# Patient Record
Sex: MALE | Race: WHITE | NOT HISPANIC OR LATINO | Employment: OTHER | URBAN - METROPOLITAN AREA
[De-identification: names, ages, dates, MRNs, and addresses within clinical notes are randomized per-mention and may not be internally consistent; named-entity substitution may affect disease eponyms.]

---

## 2024-12-06 ENCOUNTER — HOSPITAL ENCOUNTER (INPATIENT)
Facility: HOSPITAL | Age: 69
LOS: 2 days | Discharge: HOME | End: 2024-12-08
Attending: EMERGENCY MEDICINE | Admitting: INTERNAL MEDICINE
Payer: MEDICARE

## 2024-12-06 ENCOUNTER — APPOINTMENT (OUTPATIENT)
Dept: CARDIOLOGY | Facility: HOSPITAL | Age: 69
DRG: 322 | End: 2024-12-06
Payer: MEDICARE

## 2024-12-06 ENCOUNTER — APPOINTMENT (OUTPATIENT)
Dept: RADIOLOGY | Facility: HOSPITAL | Age: 69
DRG: 322 | End: 2024-12-06
Payer: MEDICARE

## 2024-12-06 DIAGNOSIS — I21.4 NSTEMI (NON-ST ELEVATED MYOCARDIAL INFARCTION) (MULTI): Primary | ICD-10-CM

## 2024-12-06 PROBLEM — E11.9 TYPE 2 DIABETES MELLITUS, WITHOUT LONG-TERM CURRENT USE OF INSULIN (MULTI): Status: ACTIVE | Noted: 2024-12-06

## 2024-12-06 LAB
ALBUMIN SERPL BCP-MCNC: 3.5 G/DL (ref 3.4–5)
ALP SERPL-CCNC: 67 U/L (ref 33–136)
ALT SERPL W P-5'-P-CCNC: 11 U/L (ref 10–52)
ANION GAP SERPL CALCULATED.3IONS-SCNC: 12 MMOL/L (ref 10–20)
APTT PPP: 26.9 SECONDS (ref 22–32.5)
AST SERPL W P-5'-P-CCNC: 12 U/L (ref 9–39)
BASOPHILS # BLD AUTO: 0.03 X10*3/UL (ref 0–0.1)
BASOPHILS NFR BLD AUTO: 0.5 %
BILIRUB SERPL-MCNC: 0.2 MG/DL (ref 0–1.2)
BNP SERPL-MCNC: 27 PG/ML (ref 0–99)
BUN SERPL-MCNC: 16 MG/DL (ref 6–23)
CALCIUM SERPL-MCNC: 7.1 MG/DL (ref 8.6–10.3)
CARDIAC TROPONIN I PNL SERPL HS: 29 NG/L (ref 0–20)
CARDIAC TROPONIN I PNL SERPL HS: 81 NG/L (ref 0–20)
CHLORIDE SERPL-SCNC: 110 MMOL/L (ref 98–107)
CO2 SERPL-SCNC: 21 MMOL/L (ref 21–32)
CREAT SERPL-MCNC: 1.04 MG/DL (ref 0.5–1.3)
D DIMER PPP FEU-MCNC: 3.73 MG/L FEU (ref 0.19–0.5)
EGFRCR SERPLBLD CKD-EPI 2021: 77 ML/MIN/1.73M*2
EOSINOPHIL # BLD AUTO: 0.09 X10*3/UL (ref 0–0.7)
EOSINOPHIL NFR BLD AUTO: 1.5 %
ERYTHROCYTE [DISTWIDTH] IN BLOOD BY AUTOMATED COUNT: 15.1 % (ref 11.5–14.5)
GLUCOSE SERPL-MCNC: 105 MG/DL (ref 74–99)
HCT VFR BLD AUTO: 37.8 % (ref 41–52)
HGB BLD-MCNC: 12.3 G/DL (ref 13.5–17.5)
IMM GRANULOCYTES # BLD AUTO: 0.04 X10*3/UL (ref 0–0.7)
IMM GRANULOCYTES NFR BLD AUTO: 0.7 % (ref 0–0.9)
LYMPHOCYTES # BLD AUTO: 1.31 X10*3/UL (ref 1.2–4.8)
LYMPHOCYTES NFR BLD AUTO: 22.4 %
MAGNESIUM SERPL-MCNC: 1.82 MG/DL (ref 1.6–2.4)
MCH RBC QN AUTO: 28.1 PG (ref 26–34)
MCHC RBC AUTO-ENTMCNC: 32.5 G/DL (ref 32–36)
MCV RBC AUTO: 87 FL (ref 80–100)
MONOCYTES # BLD AUTO: 0.55 X10*3/UL (ref 0.1–1)
MONOCYTES NFR BLD AUTO: 9.4 %
NEUTROPHILS # BLD AUTO: 3.82 X10*3/UL (ref 1.2–7.7)
NEUTROPHILS NFR BLD AUTO: 65.5 %
NRBC BLD-RTO: 0 /100 WBCS (ref 0–0)
PLATELET # BLD AUTO: 255 X10*3/UL (ref 150–450)
POTASSIUM SERPL-SCNC: 3.4 MMOL/L (ref 3.5–5.3)
PROT SERPL-MCNC: 5.9 G/DL (ref 6.4–8.2)
RBC # BLD AUTO: 4.37 X10*6/UL (ref 4.5–5.9)
SODIUM SERPL-SCNC: 140 MMOL/L (ref 136–145)
WBC # BLD AUTO: 5.8 X10*3/UL (ref 4.4–11.3)

## 2024-12-06 PROCEDURE — 93005 ELECTROCARDIOGRAM TRACING: CPT

## 2024-12-06 PROCEDURE — 2500000004 HC RX 250 GENERAL PHARMACY W/ HCPCS (ALT 636 FOR OP/ED)

## 2024-12-06 PROCEDURE — 83735 ASSAY OF MAGNESIUM: CPT

## 2024-12-06 PROCEDURE — 2550000001 HC RX 255 CONTRASTS: Performed by: EMERGENCY MEDICINE

## 2024-12-06 PROCEDURE — 36415 COLL VENOUS BLD VENIPUNCTURE: CPT

## 2024-12-06 PROCEDURE — 96374 THER/PROPH/DIAG INJ IV PUSH: CPT

## 2024-12-06 PROCEDURE — 83880 ASSAY OF NATRIURETIC PEPTIDE: CPT

## 2024-12-06 PROCEDURE — 2060000001 HC INTERMEDIATE ICU ROOM DAILY

## 2024-12-06 PROCEDURE — 96375 TX/PRO/DX INJ NEW DRUG ADDON: CPT

## 2024-12-06 PROCEDURE — 99291 CRITICAL CARE FIRST HOUR: CPT | Mod: 25

## 2024-12-06 PROCEDURE — 71045 X-RAY EXAM CHEST 1 VIEW: CPT | Performed by: RADIOLOGY

## 2024-12-06 PROCEDURE — 2500000004 HC RX 250 GENERAL PHARMACY W/ HCPCS (ALT 636 FOR OP/ED): Performed by: EMERGENCY MEDICINE

## 2024-12-06 PROCEDURE — 84484 ASSAY OF TROPONIN QUANT: CPT

## 2024-12-06 PROCEDURE — 71045 X-RAY EXAM CHEST 1 VIEW: CPT

## 2024-12-06 PROCEDURE — 85730 THROMBOPLASTIN TIME PARTIAL: CPT | Performed by: EMERGENCY MEDICINE

## 2024-12-06 PROCEDURE — 85300 ANTITHROMBIN III ACTIVITY: CPT

## 2024-12-06 PROCEDURE — 93010 ELECTROCARDIOGRAM REPORT: CPT | Performed by: INTERNAL MEDICINE

## 2024-12-06 PROCEDURE — 99285 EMERGENCY DEPT VISIT HI MDM: CPT | Mod: 25 | Performed by: EMERGENCY MEDICINE

## 2024-12-06 PROCEDURE — 85025 COMPLETE CBC W/AUTO DIFF WBC: CPT

## 2024-12-06 PROCEDURE — 80053 COMPREHEN METABOLIC PANEL: CPT

## 2024-12-06 PROCEDURE — 71275 CT ANGIOGRAPHY CHEST: CPT | Performed by: RADIOLOGY

## 2024-12-06 PROCEDURE — 71275 CT ANGIOGRAPHY CHEST: CPT

## 2024-12-06 PROCEDURE — 99223 1ST HOSP IP/OBS HIGH 75: CPT | Performed by: INTERNAL MEDICINE

## 2024-12-06 RX ORDER — HEPARIN SODIUM 10000 [USP'U]/100ML
0-4000 INJECTION, SOLUTION INTRAVENOUS CONTINUOUS
Status: DISCONTINUED | OUTPATIENT
Start: 2024-12-06 | End: 2024-12-07

## 2024-12-06 RX ORDER — HEPARIN SODIUM 5000 [USP'U]/ML
2000-4000 INJECTION, SOLUTION INTRAVENOUS; SUBCUTANEOUS AS NEEDED
Status: DISCONTINUED | OUTPATIENT
Start: 2024-12-06 | End: 2024-12-08 | Stop reason: HOSPADM

## 2024-12-06 RX ORDER — ONDANSETRON HYDROCHLORIDE 2 MG/ML
4 INJECTION, SOLUTION INTRAVENOUS ONCE
Status: COMPLETED | OUTPATIENT
Start: 2024-12-06 | End: 2024-12-06

## 2024-12-06 RX ORDER — POLYETHYLENE GLYCOL 3350 17 G/17G
17 POWDER, FOR SOLUTION ORAL DAILY
Status: DISCONTINUED | OUTPATIENT
Start: 2024-12-07 | End: 2024-12-08 | Stop reason: HOSPADM

## 2024-12-06 RX ORDER — SODIUM CHLORIDE AND POTASSIUM CHLORIDE 150; 450 MG/100ML; MG/100ML
75 INJECTION, SOLUTION INTRAVENOUS CONTINUOUS
Status: DISCONTINUED | OUTPATIENT
Start: 2024-12-06 | End: 2024-12-07

## 2024-12-06 RX ORDER — HEPARIN SODIUM 5000 [USP'U]/ML
4000 INJECTION, SOLUTION INTRAVENOUS; SUBCUTANEOUS ONCE
Status: COMPLETED | OUTPATIENT
Start: 2024-12-06 | End: 2024-12-06

## 2024-12-06 RX ORDER — ASPIRIN 325 MG
325 TABLET ORAL ONCE
Status: DISCONTINUED | OUTPATIENT
Start: 2024-12-06 | End: 2024-12-07

## 2024-12-06 RX ORDER — MORPHINE SULFATE 4 MG/ML
4 INJECTION, SOLUTION INTRAMUSCULAR; INTRAVENOUS ONCE
Status: COMPLETED | OUTPATIENT
Start: 2024-12-06 | End: 2024-12-06

## 2024-12-06 RX ORDER — ACETAMINOPHEN 325 MG/1
650 TABLET ORAL EVERY 4 HOURS PRN
Status: DISCONTINUED | OUTPATIENT
Start: 2024-12-06 | End: 2024-12-08 | Stop reason: HOSPADM

## 2024-12-06 RX ORDER — ACETAMINOPHEN 160 MG/5ML
650 SOLUTION ORAL EVERY 4 HOURS PRN
Status: DISCONTINUED | OUTPATIENT
Start: 2024-12-06 | End: 2024-12-08 | Stop reason: HOSPADM

## 2024-12-06 RX ORDER — FAMOTIDINE 10 MG/ML
20 INJECTION INTRAVENOUS ONCE
Status: COMPLETED | OUTPATIENT
Start: 2024-12-06 | End: 2024-12-06

## 2024-12-06 RX ORDER — INSULIN LISPRO 100 [IU]/ML
0-10 INJECTION, SOLUTION INTRAVENOUS; SUBCUTANEOUS
Status: DISCONTINUED | OUTPATIENT
Start: 2024-12-07 | End: 2024-12-07

## 2024-12-06 RX ORDER — ATORVASTATIN CALCIUM 80 MG/1
80 TABLET, FILM COATED ORAL NIGHTLY
Status: DISCONTINUED | OUTPATIENT
Start: 2024-12-06 | End: 2024-12-08

## 2024-12-06 RX ORDER — ACETAMINOPHEN 650 MG/1
650 SUPPOSITORY RECTAL EVERY 4 HOURS PRN
Status: DISCONTINUED | OUTPATIENT
Start: 2024-12-06 | End: 2024-12-08 | Stop reason: HOSPADM

## 2024-12-06 RX ORDER — NITROGLYCERIN 0.4 MG/1
0.4 TABLET SUBLINGUAL EVERY 5 MIN PRN
Status: DISCONTINUED | OUTPATIENT
Start: 2024-12-06 | End: 2024-12-08 | Stop reason: HOSPADM

## 2024-12-06 RX ORDER — NAPROXEN SODIUM 220 MG/1
81 TABLET, FILM COATED ORAL DAILY
Status: DISCONTINUED | OUTPATIENT
Start: 2024-12-07 | End: 2024-12-07

## 2024-12-06 ASSESSMENT — COLUMBIA-SUICIDE SEVERITY RATING SCALE - C-SSRS
6. HAVE YOU EVER DONE ANYTHING, STARTED TO DO ANYTHING, OR PREPARED TO DO ANYTHING TO END YOUR LIFE?: NO
1. IN THE PAST MONTH, HAVE YOU WISHED YOU WERE DEAD OR WISHED YOU COULD GO TO SLEEP AND NOT WAKE UP?: NO
2. HAVE YOU ACTUALLY HAD ANY THOUGHTS OF KILLING YOURSELF?: NO

## 2024-12-06 ASSESSMENT — LIFESTYLE VARIABLES
EVER FELT BAD OR GUILTY ABOUT YOUR DRINKING: NO
HAVE YOU EVER FELT YOU SHOULD CUT DOWN ON YOUR DRINKING: NO
EVER HAD A DRINK FIRST THING IN THE MORNING TO STEADY YOUR NERVES TO GET RID OF A HANGOVER: NO
TOTAL SCORE: 0
HAVE PEOPLE ANNOYED YOU BY CRITICIZING YOUR DRINKING: NO

## 2024-12-06 ASSESSMENT — PAIN - FUNCTIONAL ASSESSMENT: PAIN_FUNCTIONAL_ASSESSMENT: 0-10

## 2024-12-06 ASSESSMENT — PAIN SCALES - GENERAL: PAINLEVEL_OUTOF10: 4

## 2024-12-06 NOTE — ED TRIAGE NOTES
CLINICAL NUTRITION SERVICES    Reviewed nutrition risk factors due to LOS. Pt is tolerating diet, eating well per nursing documentation. No nutrition issues identified at this time. RD will follow via rounds at this time, unless consulted.      Elizabeth Wu RDN, LD  Clinical Dietitian  [facility]: [phone]  [facility]: [phone]     Pt states he was finished eating dinner and got 10/10 chest pain, tightness in the chest and the pain radiated down left arm. Pt got two nitroglycerin in the squad, aspirin, and zofran. Pt states pain has gotten better but he is still having the pain.

## 2024-12-06 NOTE — Clinical Note
Angioplasty of the left anterior descending lesion. Inflation 1: Pressure = 12 kenroy; Duration = 20 sec. Inflation 2: Pressure = 12 kenroy; Duration = 20 sec.

## 2024-12-06 NOTE — PROGRESS NOTES
Attestation/Supervisory note for ALAN Garcia      The patient is a 69-year-old male presenting to the emergency department for evaluation of substernal chest pain and left arm pain.  He states his symptoms started about 2 to 3 hours prior to arrival.  The chest pain and the left arm pain started around the same time.  He states it is a heaviness.  No better or worse.  No radiation.  It is constant.  He denies any fever or chills.  No cough or congestion.  No headache or visual changes.  No palpitations.  No diaphoresis.  No abdominal pain.  No nausea vomiting.  No diarrhea or constipation.  No urinary complaints.  He denies any use of tobacco.  No history of CAD or ACS.  No history of PE or DVT.  No recent travel or immobility.  No recent surgery.  He does have a history of diabetes and hypertension.  No history of hyperlipidemia that he knows of.  All pertinent positives and negatives are recorded above.  All other systems reviewed and otherwise negative.  Vital signs within normal limits.  Physical exam with a well-nourished well-developed male in no acute distress.  HEENT exam within normal limits.  He has no evidence of airway compromise or respiratory distress.  Abdominal exam is benign.  He does not have any gross motor, neurologic or vascular deficits on exam.  Pulses are equal bilaterally.      EKG with sinus rhythm at 72 bpm, occasional PVCs, leftward axis, right bundle branch block pattern, normal ST segment, normal T waves      Oral aspirin, IV Pepcid, IV morphine and IV Zofran ordered.      Diagnostic labs with an elevated D-dimer, mild electrolyte imbalance, borderline troponin, but otherwise unremarkable.      Initial troponin 29.  Repeat trop 81      Heart score 4      CT angio chest for pulmonary embolism   Final Result   1. No acute pulmonary embolism to the segmental arterial level.   2. Additional findings as described above.             MACRO:   None        Signed by: Elver Garrido 12/6/2024 8:40  PM   Dictation workstation:   [id number]      XR chest 1 view   Final Result   No acute cardiopulmonary process.        MACRO:   None        Signed by: Elver Lloydjose 12/6/2024 6:33 PM   Dictation workstation:   [id number]      Transthoracic Echo (TTE) Complete    (Results Pending)      The patient does not have any evidence of a STEMI on EKG but does have an elevation in his troponin level.  No events on telemetry.  He remains hemodynamically stable while in the emergency room.  He does not have any evidence of airway compromise or respiratory distress on exam.  Chest x-ray without acute process.  No evidence of pneumonia or pneumothorax.  No evidence of CHF.  No widening of the mediastinum.  He does have an elevated D-dimer and a CT angio chest was ordered.  Showed no evidence of PE or dissection.  No other acute findings.  The results of the diagnostic studies and labs were discussed with the cardiologist, Dr. Escobar, and he did recommend treatment with heparin.  It was ordered      Impression/diagnosis:  1.  Chest pain, substernal  2.  Electrolyte imbalance  3.  Non-STEMI      Critical care time of 16 minutes billed for management of non-STEMI with initiation of oral aspirin, initiation of IV heparin, consultation with the patient regarding his results, monitoring patient's telemetry and consultation with the cardiologist.  This time excludes time for billable procedures.      critical care time billed for by me is non concurrent with time billed for by ALAN Arizmendi      I personally saw the patient and made/approve the management plan and take responsibility for the patient management.      I independently interpreted the following study (S) EKG and diagnostic labs      I personally discussed the patient's management with the patient      I reviewed the results of the diagnostic labs and diagnostic imaging.  Formal radiology read was completed by the radiologist.      Ileana Dempsey MD

## 2024-12-06 NOTE — Clinical Note
Angioplasty of the left anterior descending lesion. Inflation 1: Pressure = 8 kenroy; Duration = 20 sec. Inflation 2: Pressure = 8 kenroy; Duration = 20 sec. Inflation 3: Pressure = 8 kenroy; Duration = 20 sec.

## 2024-12-07 ENCOUNTER — APPOINTMENT (OUTPATIENT)
Dept: CARDIOLOGY | Facility: HOSPITAL | Age: 69
DRG: 322 | End: 2024-12-07
Payer: MEDICARE

## 2024-12-07 ENCOUNTER — APPOINTMENT (OUTPATIENT)
Dept: CARDIOLOGY | Facility: HOSPITAL | Age: 69
End: 2024-12-07
Payer: MEDICARE

## 2024-12-07 LAB
AORTIC VALVE MEAN GRADIENT: 3 MMHG
AORTIC VALVE PEAK VELOCITY: 1.32 M/S
APTT PPP: 36.7 SECONDS (ref 22–32.5)
AV PEAK GRADIENT: 7 MMHG
AVA (PEAK VEL): 2.2 CM2
AVA (VTI): 2.59 CM2
CARDIAC TROPONIN I PNL SERPL HS: 2695 NG/L (ref 0–20)
CARDIAC TROPONIN I PNL SERPL HS: ABNORMAL NG/L (ref 0–20)
CHOLEST SERPL-MCNC: 197 MG/DL (ref 0–199)
CHOLEST/HDLC SERPL: 3.8 {RATIO}
EJECTION FRACTION APICAL 4 CHAMBER: 61.7
EJECTION FRACTION: 58 %
GLUCOSE BLD MANUAL STRIP-MCNC: 125 MG/DL (ref 74–99)
GLUCOSE BLD MANUAL STRIP-MCNC: 129 MG/DL (ref 74–99)
GLUCOSE BLD MANUAL STRIP-MCNC: 136 MG/DL (ref 74–99)
GLUCOSE BLD MANUAL STRIP-MCNC: 142 MG/DL (ref 74–99)
HDLC SERPL-MCNC: 52.1 MG/DL
LDLC SERPL CALC-MCNC: 107 MG/DL
LEFT ATRIUM VOLUME AREA LENGTH INDEX BSA: 44.4 ML/M2
LEFT VENTRICLE INTERNAL DIMENSION DIASTOLE: 5.08 CM (ref 3.5–6)
LEFT VENTRICULAR OUTFLOW TRACT DIAMETER: 2.1 CM
LV EJECTION FRACTION BIPLANE: 62 %
MITRAL VALVE E/A RATIO: 1.12
MITRAL VALVE E/E' RATIO: 2.52
NON HDL CHOLESTEROL: 145 MG/DL (ref 0–149)
RIGHT VENTRICLE FREE WALL PEAK S': 16.96 CM/S
TRICUSPID ANNULAR PLANE SYSTOLIC EXCURSION: 2.5 CM
TRIGL SERPL-MCNC: 191 MG/DL (ref 0–149)
VLDL: 38 MG/DL (ref 0–40)

## 2024-12-07 PROCEDURE — 2500000004 HC RX 250 GENERAL PHARMACY W/ HCPCS (ALT 636 FOR OP/ED): Mod: JZ | Performed by: INTERNAL MEDICINE

## 2024-12-07 PROCEDURE — 4A023N7 MEASUREMENT OF CARDIAC SAMPLING AND PRESSURE, LEFT HEART, PERCUTANEOUS APPROACH: ICD-10-PCS | Performed by: INTERNAL MEDICINE

## 2024-12-07 PROCEDURE — 99153 MOD SED SAME PHYS/QHP EA: CPT | Performed by: INTERNAL MEDICINE

## 2024-12-07 PROCEDURE — C1760 CLOSURE DEV, VASC: HCPCS | Performed by: INTERNAL MEDICINE

## 2024-12-07 PROCEDURE — 2500000005 HC RX 250 GENERAL PHARMACY W/O HCPCS: Performed by: INTERNAL MEDICINE

## 2024-12-07 PROCEDURE — 85347 COAGULATION TIME ACTIVATED: CPT

## 2024-12-07 PROCEDURE — 2780000003 HC OR 278 NO HCPCS: Performed by: INTERNAL MEDICINE

## 2024-12-07 PROCEDURE — 93005 ELECTROCARDIOGRAM TRACING: CPT

## 2024-12-07 PROCEDURE — C1874 STENT, COATED/COV W/DEL SYS: HCPCS | Performed by: INTERNAL MEDICINE

## 2024-12-07 PROCEDURE — C1769 GUIDE WIRE: HCPCS | Performed by: INTERNAL MEDICINE

## 2024-12-07 PROCEDURE — 84484 ASSAY OF TROPONIN QUANT: CPT | Performed by: INTERNAL MEDICINE

## 2024-12-07 PROCEDURE — 2500000002 HC RX 250 W HCPCS SELF ADMINISTERED DRUGS (ALT 637 FOR MEDICARE OP, ALT 636 FOR OP/ED): Performed by: STUDENT IN AN ORGANIZED HEALTH CARE EDUCATION/TRAINING PROGRAM

## 2024-12-07 PROCEDURE — 93010 ELECTROCARDIOGRAM REPORT: CPT | Performed by: INTERNAL MEDICINE

## 2024-12-07 PROCEDURE — 93306 TTE W/DOPPLER COMPLETE: CPT | Performed by: INTERNAL MEDICINE

## 2024-12-07 PROCEDURE — C1887 CATHETER, GUIDING: HCPCS | Performed by: INTERNAL MEDICINE

## 2024-12-07 PROCEDURE — 2500000002 HC RX 250 W HCPCS SELF ADMINISTERED DRUGS (ALT 637 FOR MEDICARE OP, ALT 636 FOR OP/ED): Performed by: INTERNAL MEDICINE

## 2024-12-07 PROCEDURE — 82947 ASSAY GLUCOSE BLOOD QUANT: CPT

## 2024-12-07 PROCEDURE — 2060000001 HC INTERMEDIATE ICU ROOM DAILY

## 2024-12-07 PROCEDURE — 92928 PRQ TCAT PLMT NTRAC ST 1 LES: CPT | Performed by: INTERNAL MEDICINE

## 2024-12-07 PROCEDURE — B2111ZZ FLUOROSCOPY OF MULTIPLE CORONARY ARTERIES USING LOW OSMOLAR CONTRAST: ICD-10-PCS | Performed by: INTERNAL MEDICINE

## 2024-12-07 PROCEDURE — 2500000004 HC RX 250 GENERAL PHARMACY W/ HCPCS (ALT 636 FOR OP/ED): Performed by: NURSE PRACTITIONER

## 2024-12-07 PROCEDURE — 2720000007 HC OR 272 NO HCPCS: Performed by: INTERNAL MEDICINE

## 2024-12-07 PROCEDURE — 93458 L HRT ARTERY/VENTRICLE ANGIO: CPT | Performed by: INTERNAL MEDICINE

## 2024-12-07 PROCEDURE — 83718 ASSAY OF LIPOPROTEIN: CPT | Performed by: INTERNAL MEDICINE

## 2024-12-07 PROCEDURE — 99232 SBSQ HOSP IP/OBS MODERATE 35: CPT | Performed by: STUDENT IN AN ORGANIZED HEALTH CARE EDUCATION/TRAINING PROGRAM

## 2024-12-07 PROCEDURE — C1894 INTRO/SHEATH, NON-LASER: HCPCS | Performed by: INTERNAL MEDICINE

## 2024-12-07 PROCEDURE — 93306 TTE W/DOPPLER COMPLETE: CPT

## 2024-12-07 PROCEDURE — 2500000001 HC RX 250 WO HCPCS SELF ADMINISTERED DRUGS (ALT 637 FOR MEDICARE OP): Performed by: INTERNAL MEDICINE

## 2024-12-07 PROCEDURE — 027034Z DILATION OF CORONARY ARTERY, ONE ARTERY WITH DRUG-ELUTING INTRALUMINAL DEVICE, PERCUTANEOUS APPROACH: ICD-10-PCS | Performed by: INTERNAL MEDICINE

## 2024-12-07 PROCEDURE — 99152 MOD SED SAME PHYS/QHP 5/>YRS: CPT | Performed by: INTERNAL MEDICINE

## 2024-12-07 PROCEDURE — 99222 1ST HOSP IP/OBS MODERATE 55: CPT | Performed by: NURSE PRACTITIONER

## 2024-12-07 PROCEDURE — 36415 COLL VENOUS BLD VENIPUNCTURE: CPT | Performed by: INTERNAL MEDICINE

## 2024-12-07 PROCEDURE — 2550000001 HC RX 255 CONTRASTS: Performed by: INTERNAL MEDICINE

## 2024-12-07 PROCEDURE — 2500000004 HC RX 250 GENERAL PHARMACY W/ HCPCS (ALT 636 FOR OP/ED)

## 2024-12-07 PROCEDURE — C1725 CATH, TRANSLUMIN NON-LASER: HCPCS | Performed by: INTERNAL MEDICINE

## 2024-12-07 PROCEDURE — 2500000004 HC RX 250 GENERAL PHARMACY W/ HCPCS (ALT 636 FOR OP/ED): Performed by: INTERNAL MEDICINE

## 2024-12-07 PROCEDURE — C9600 PERC DRUG-EL COR STENT SING: HCPCS | Mod: LD | Performed by: INTERNAL MEDICINE

## 2024-12-07 PROCEDURE — 85730 THROMBOPLASTIN TIME PARTIAL: CPT

## 2024-12-07 DEVICE — STENT ONYXNG30015UX ONYX 3.00X15RX
Type: IMPLANTABLE DEVICE | Site: HEART | Status: FUNCTIONAL
Brand: ONYX FRONTIER™

## 2024-12-07 RX ORDER — POTASSIUM CHLORIDE 20 MEQ/1
40 TABLET, EXTENDED RELEASE ORAL ONCE
Status: COMPLETED | OUTPATIENT
Start: 2024-12-07 | End: 2024-12-07

## 2024-12-07 RX ORDER — VERAPAMIL HYDROCHLORIDE 2.5 MG/ML
INJECTION, SOLUTION INTRAVENOUS AS NEEDED
Status: DISCONTINUED | OUTPATIENT
Start: 2024-12-07 | End: 2024-12-07 | Stop reason: HOSPADM

## 2024-12-07 RX ORDER — INSULIN LISPRO 100 [IU]/ML
0-5 INJECTION, SOLUTION INTRAVENOUS; SUBCUTANEOUS
Status: DISCONTINUED | OUTPATIENT
Start: 2024-12-07 | End: 2024-12-08 | Stop reason: HOSPADM

## 2024-12-07 RX ORDER — SODIUM CHLORIDE 9 MG/ML
100 INJECTION, SOLUTION INTRAVENOUS CONTINUOUS
Status: ACTIVE | OUTPATIENT
Start: 2024-12-07 | End: 2024-12-07

## 2024-12-07 RX ORDER — FENTANYL CITRATE 50 UG/ML
INJECTION, SOLUTION INTRAMUSCULAR; INTRAVENOUS AS NEEDED
Status: DISCONTINUED | OUTPATIENT
Start: 2024-12-07 | End: 2024-12-07 | Stop reason: HOSPADM

## 2024-12-07 RX ORDER — WATER
100 LIQUID (ML) MISCELLANEOUS
Status: DISCONTINUED | OUTPATIENT
Start: 2024-12-07 | End: 2024-12-08 | Stop reason: HOSPADM

## 2024-12-07 RX ORDER — LIDOCAINE HYDROCHLORIDE 10 MG/ML
INJECTION, SOLUTION EPIDURAL; INFILTRATION; INTRACAUDAL; PERINEURAL AS NEEDED
Status: DISCONTINUED | OUTPATIENT
Start: 2024-12-07 | End: 2024-12-07 | Stop reason: HOSPADM

## 2024-12-07 RX ORDER — NITROGLYCERIN 40 MG/100ML
INJECTION INTRAVENOUS AS NEEDED
Status: DISCONTINUED | OUTPATIENT
Start: 2024-12-07 | End: 2024-12-07 | Stop reason: HOSPADM

## 2024-12-07 RX ORDER — LISINOPRIL AND HYDROCHLOROTHIAZIDE 10; 12.5 MG/1; MG/1
1 TABLET ORAL DAILY
COMMUNITY
Start: 2023-11-13 | End: 2024-12-08 | Stop reason: HOSPADM

## 2024-12-07 RX ORDER — IODIXANOL 320 MG/ML
INJECTION, SOLUTION INTRAVASCULAR AS NEEDED
Status: DISCONTINUED | OUTPATIENT
Start: 2024-12-07 | End: 2024-12-07 | Stop reason: HOSPADM

## 2024-12-07 RX ORDER — ENOXAPARIN SODIUM 100 MG/ML
40 INJECTION SUBCUTANEOUS EVERY 24 HOURS
Status: DISCONTINUED | OUTPATIENT
Start: 2024-12-07 | End: 2024-12-08 | Stop reason: HOSPADM

## 2024-12-07 RX ORDER — HEPARIN SODIUM 1000 [USP'U]/ML
INJECTION, SOLUTION INTRAVENOUS; SUBCUTANEOUS AS NEEDED
Status: DISCONTINUED | OUTPATIENT
Start: 2024-12-07 | End: 2024-12-07 | Stop reason: HOSPADM

## 2024-12-07 RX ORDER — MIDAZOLAM HYDROCHLORIDE 1 MG/ML
INJECTION, SOLUTION INTRAMUSCULAR; INTRAVENOUS AS NEEDED
Status: DISCONTINUED | OUTPATIENT
Start: 2024-12-07 | End: 2024-12-07 | Stop reason: HOSPADM

## 2024-12-07 RX ORDER — NAPROXEN SODIUM 220 MG/1
81 TABLET, FILM COATED ORAL DAILY
Status: DISCONTINUED | OUTPATIENT
Start: 2024-12-08 | End: 2024-12-08 | Stop reason: HOSPADM

## 2024-12-07 SDOH — ECONOMIC STABILITY: FOOD INSECURITY: WITHIN THE PAST 12 MONTHS, THE FOOD YOU BOUGHT JUST DIDN'T LAST AND YOU DIDN'T HAVE MONEY TO GET MORE.: NEVER TRUE

## 2024-12-07 SDOH — SOCIAL STABILITY: SOCIAL INSECURITY: WITHIN THE LAST YEAR, HAVE YOU BEEN AFRAID OF YOUR PARTNER OR EX-PARTNER?: NO

## 2024-12-07 SDOH — HEALTH STABILITY: MENTAL HEALTH: HOW OFTEN DO YOU HAVE A DRINK CONTAINING ALCOHOL?: 2-4 TIMES A MONTH

## 2024-12-07 SDOH — SOCIAL STABILITY: SOCIAL INSECURITY: ABUSE: ADULT

## 2024-12-07 SDOH — ECONOMIC STABILITY: HOUSING INSECURITY: IN THE LAST 12 MONTHS, WAS THERE A TIME WHEN YOU WERE NOT ABLE TO PAY THE MORTGAGE OR RENT ON TIME?: NO

## 2024-12-07 SDOH — ECONOMIC STABILITY: FOOD INSECURITY: WITHIN THE PAST 12 MONTHS, YOU WORRIED THAT YOUR FOOD WOULD RUN OUT BEFORE YOU GOT THE MONEY TO BUY MORE.: NEVER TRUE

## 2024-12-07 SDOH — SOCIAL STABILITY: SOCIAL INSECURITY: WITHIN THE LAST YEAR, HAVE YOU BEEN HUMILIATED OR EMOTIONALLY ABUSED IN OTHER WAYS BY YOUR PARTNER OR EX-PARTNER?: NO

## 2024-12-07 SDOH — HEALTH STABILITY: PHYSICAL HEALTH
HOW OFTEN DO YOU NEED TO HAVE SOMEONE HELP YOU WHEN YOU READ INSTRUCTIONS, PAMPHLETS, OR OTHER WRITTEN MATERIAL FROM YOUR DOCTOR OR PHARMACY?: NEVER

## 2024-12-07 SDOH — SOCIAL STABILITY: SOCIAL NETWORK
DO YOU BELONG TO ANY CLUBS OR ORGANIZATIONS SUCH AS CHURCH GROUPS, UNIONS, FRATERNAL OR ATHLETIC GROUPS, OR SCHOOL GROUPS?: NO

## 2024-12-07 SDOH — HEALTH STABILITY: PHYSICAL HEALTH: ON AVERAGE, HOW MANY DAYS PER WEEK DO YOU ENGAGE IN MODERATE TO STRENUOUS EXERCISE (LIKE A BRISK WALK)?: 7 DAYS

## 2024-12-07 SDOH — HEALTH STABILITY: MENTAL HEALTH: HOW OFTEN DO YOU HAVE SIX OR MORE DRINKS ON ONE OCCASION?: NEVER

## 2024-12-07 SDOH — ECONOMIC STABILITY: INCOME INSECURITY: IN THE PAST 12 MONTHS HAS THE ELECTRIC, GAS, OIL, OR WATER COMPANY THREATENED TO SHUT OFF SERVICES IN YOUR HOME?: NO

## 2024-12-07 SDOH — SOCIAL STABILITY: SOCIAL INSECURITY: DO YOU FEEL ANYONE HAS EXPLOITED OR TAKEN ADVANTAGE OF YOU FINANCIALLY OR OF YOUR PERSONAL PROPERTY?: NO

## 2024-12-07 SDOH — HEALTH STABILITY: MENTAL HEALTH
DO YOU FEEL STRESS - TENSE, RESTLESS, NERVOUS, OR ANXIOUS, OR UNABLE TO SLEEP AT NIGHT BECAUSE YOUR MIND IS TROUBLED ALL THE TIME - THESE DAYS?: NOT AT ALL

## 2024-12-07 SDOH — SOCIAL STABILITY: SOCIAL INSECURITY
WITHIN THE LAST YEAR, HAVE YOU BEEN KICKED, HIT, SLAPPED, OR OTHERWISE PHYSICALLY HURT BY YOUR PARTNER OR EX-PARTNER?: NO

## 2024-12-07 SDOH — HEALTH STABILITY: PHYSICAL HEALTH: ON AVERAGE, HOW MANY MINUTES DO YOU ENGAGE IN EXERCISE AT THIS LEVEL?: 50 MIN

## 2024-12-07 SDOH — SOCIAL STABILITY: SOCIAL INSECURITY: ARE YOU MARRIED, WIDOWED, DIVORCED, SEPARATED, NEVER MARRIED, OR LIVING WITH A PARTNER?: MARRIED

## 2024-12-07 SDOH — SOCIAL STABILITY: SOCIAL INSECURITY: ARE THERE ANY APPARENT SIGNS OF INJURIES/BEHAVIORS THAT COULD BE RELATED TO ABUSE/NEGLECT?: NO

## 2024-12-07 SDOH — HEALTH STABILITY: MENTAL HEALTH: HOW MANY DRINKS CONTAINING ALCOHOL DO YOU HAVE ON A TYPICAL DAY WHEN YOU ARE DRINKING?: 1 OR 2

## 2024-12-07 SDOH — SOCIAL STABILITY: SOCIAL INSECURITY: HAVE YOU HAD THOUGHTS OF HARMING ANYONE ELSE?: NO

## 2024-12-07 SDOH — SOCIAL STABILITY: SOCIAL INSECURITY: DOES ANYONE TRY TO KEEP YOU FROM HAVING/CONTACTING OTHER FRIENDS OR DOING THINGS OUTSIDE YOUR HOME?: NO

## 2024-12-07 SDOH — SOCIAL STABILITY: SOCIAL INSECURITY
WITHIN THE LAST YEAR, HAVE YOU BEEN RAPED OR FORCED TO HAVE ANY KIND OF SEXUAL ACTIVITY BY YOUR PARTNER OR EX-PARTNER?: NO

## 2024-12-07 SDOH — ECONOMIC STABILITY: HOUSING INSECURITY: IN THE PAST 12 MONTHS, HOW MANY TIMES HAVE YOU MOVED WHERE YOU WERE LIVING?: 0

## 2024-12-07 SDOH — SOCIAL STABILITY: SOCIAL NETWORK
IN A TYPICAL WEEK, HOW MANY TIMES DO YOU TALK ON THE PHONE WITH FAMILY, FRIENDS, OR NEIGHBORS?: MORE THAN THREE TIMES A WEEK

## 2024-12-07 SDOH — SOCIAL STABILITY: SOCIAL INSECURITY: WERE YOU ABLE TO COMPLETE ALL THE BEHAVIORAL HEALTH SCREENINGS?: YES

## 2024-12-07 SDOH — ECONOMIC STABILITY: HOUSING INSECURITY: AT ANY TIME IN THE PAST 12 MONTHS, WERE YOU HOMELESS OR LIVING IN A SHELTER (INCLUDING NOW)?: NO

## 2024-12-07 SDOH — SOCIAL STABILITY: SOCIAL NETWORK: HOW OFTEN DO YOU ATTEND MEETINGS OF THE CLUBS OR ORGANIZATIONS YOU BELONG TO?: NEVER

## 2024-12-07 SDOH — ECONOMIC STABILITY: FOOD INSECURITY: HOW HARD IS IT FOR YOU TO PAY FOR THE VERY BASICS LIKE FOOD, HOUSING, MEDICAL CARE, AND HEATING?: NOT HARD AT ALL

## 2024-12-07 SDOH — SOCIAL STABILITY: SOCIAL INSECURITY: HAS ANYONE EVER THREATENED TO HURT YOUR FAMILY OR YOUR PETS?: NO

## 2024-12-07 SDOH — SOCIAL STABILITY: SOCIAL INSECURITY: HAVE YOU HAD ANY THOUGHTS OF HARMING ANYONE ELSE?: NO

## 2024-12-07 SDOH — SOCIAL STABILITY: SOCIAL INSECURITY: DO YOU FEEL UNSAFE GOING BACK TO THE PLACE WHERE YOU ARE LIVING?: NO

## 2024-12-07 SDOH — ECONOMIC STABILITY: TRANSPORTATION INSECURITY: IN THE PAST 12 MONTHS, HAS LACK OF TRANSPORTATION KEPT YOU FROM MEDICAL APPOINTMENTS OR FROM GETTING MEDICATIONS?: NO

## 2024-12-07 SDOH — SOCIAL STABILITY: SOCIAL INSECURITY: ARE YOU OR HAVE YOU BEEN THREATENED OR ABUSED PHYSICALLY, EMOTIONALLY, OR SEXUALLY BY ANYONE?: NO

## 2024-12-07 SDOH — SOCIAL STABILITY: SOCIAL NETWORK: HOW OFTEN DO YOU GET TOGETHER WITH FRIENDS OR RELATIVES?: ONCE A WEEK

## 2024-12-07 SDOH — SOCIAL STABILITY: SOCIAL NETWORK: HOW OFTEN DO YOU ATTEND CHURCH OR RELIGIOUS SERVICES?: NEVER

## 2024-12-07 ASSESSMENT — COGNITIVE AND FUNCTIONAL STATUS - GENERAL
MOBILITY SCORE: 24
DAILY ACTIVITIY SCORE: 24
MOBILITY SCORE: 24
PATIENT BASELINE BEDBOUND: NO
CLIMB 3 TO 5 STEPS WITH RAILING: A LITTLE
DAILY ACTIVITIY SCORE: 24
MOBILITY SCORE: 23
DAILY ACTIVITIY SCORE: 24

## 2024-12-07 ASSESSMENT — PAIN SCALES - GENERAL
PAINLEVEL_OUTOF10: 5 - MODERATE PAIN
PAINLEVEL_OUTOF10: 2
PAINLEVEL_OUTOF10: 0 - NO PAIN
PAINLEVEL_OUTOF10: 1
PAINLEVEL_OUTOF10: 1

## 2024-12-07 ASSESSMENT — PAIN - FUNCTIONAL ASSESSMENT
PAIN_FUNCTIONAL_ASSESSMENT: 0-10

## 2024-12-07 ASSESSMENT — LIFESTYLE VARIABLES
SKIP TO QUESTIONS 9-10: 1
AUDIT-C TOTAL SCORE: 2
SKIP TO QUESTIONS 9-10: 1
AUDIT-C TOTAL SCORE: 2
HOW OFTEN DO YOU HAVE A DRINK CONTAINING ALCOHOL: 2-4 TIMES A MONTH
HOW MANY STANDARD DRINKS CONTAINING ALCOHOL DO YOU HAVE ON A TYPICAL DAY: 1 OR 2
HOW OFTEN DO YOU HAVE 6 OR MORE DRINKS ON ONE OCCASION: NEVER
AUDIT-C TOTAL SCORE: 2
PRESCIPTION_ABUSE_PAST_12_MONTHS: NO
SUBSTANCE_ABUSE_PAST_12_MONTHS: NO

## 2024-12-07 ASSESSMENT — ACTIVITIES OF DAILY LIVING (ADL)
HEARING - RIGHT EAR: FUNCTIONAL
HEARING - LEFT EAR: FUNCTIONAL
WALKS IN HOME: INDEPENDENT
BATHING: INDEPENDENT
DRESSING YOURSELF: INDEPENDENT
LACK_OF_TRANSPORTATION: NO
TOILETING: INDEPENDENT
FEEDING YOURSELF: INDEPENDENT
PATIENT'S MEMORY ADEQUATE TO SAFELY COMPLETE DAILY ACTIVITIES?: YES
ADEQUATE_TO_COMPLETE_ADL: YES
GROOMING: INDEPENDENT
JUDGMENT_ADEQUATE_SAFELY_COMPLETE_DAILY_ACTIVITIES: YES

## 2024-12-07 ASSESSMENT — PAIN DESCRIPTION - DESCRIPTORS
DESCRIPTORS: ACHING
DESCRIPTORS: ACHING

## 2024-12-07 ASSESSMENT — PATIENT HEALTH QUESTIONNAIRE - PHQ9
2. FEELING DOWN, DEPRESSED OR HOPELESS: NOT AT ALL
SUM OF ALL RESPONSES TO PHQ9 QUESTIONS 1 & 2: 0
1. LITTLE INTEREST OR PLEASURE IN DOING THINGS: NOT AT ALL

## 2024-12-07 ASSESSMENT — PAIN DESCRIPTION - LOCATION: LOCATION: HEAD

## 2024-12-07 ASSESSMENT — PAIN DESCRIPTION - ORIENTATION: ORIENTATION: MID

## 2024-12-07 NOTE — CARE PLAN
The patient's goals for the shift include      The clinical goals for the shift include no chest pain    Over the shift, the patient did not make progress toward the following goals. Barriers to progression include chest pain on admission to ed. Recommendations to address these barriers include moniotr vitals and for CP.

## 2024-12-07 NOTE — NURSING NOTE
0512 Reports chest pain, 3/10, left arm pain 5/10 and jaw pain 2/10 with his back teeth hurting,  /80 (101), SR 62 noted on telemetry, 1 NGT 0.4 sublingual administered.    0517 /71 (83) HR 71, chest pain and jaw pain 0/10, left arm pain 1/10.

## 2024-12-07 NOTE — CONSULTS
Inpatient consult to Cardiology  Consult performed by: LORE Adams-CNP  Consult ordered by: Chauncey Reed DO  Reason for consult: Chest pain, acute NSTEMI        History Of Present Illness:    Albin Estrada is a 69 y.o. male presenting with chest pain.  No cardiologist.  Past medical history of diabetes mellitus type 2, obesity.  Family history of cardiac disease with history of bypass.  Reports he was eating dinner last evening and shortly after developed a tightness in his left arm and felt warm with heaviness across his chest as well as some shortness of breath and pain to his left jaw.  The patient did not wish to come to the emergency department was convinced by his wife.  In the emergency department he received nitroglycerin and after 2 dosages had some relief in his chest.  EKG in emergency department a sinus rhythm with right bundle branch block with a wide QRS which is chronic compared to previous from earlier this year.  High-sensitivity troponin values of 29, 81, 2695 and 11,214.  CT chest angio negative for pulmonary emboli.  Chest x-ray negative.  Creatinine 1.04 with a hemoglobin of 12.3.  Patient was placed on a heparin drip and admitted on telemetry further testing treatment.  Note: Chest pain did nearly resolve overnight, this morning at approximately 400 he had a recurrence of this chest discomfort while on a heparin drip which improved nitroglycerin again.     Last Recorded Vitals:  Vitals:    12/07/24 0130 12/07/24 0512 12/07/24 0517 12/07/24 0706   BP: 138/63 158/80 126/71 117/65   BP Location: Right arm Right arm  Right arm   Patient Position: Lying   Lying   Pulse: 58 68 74    Resp: 18 16  18   Temp: 36.2 °C (97.2 °F) 36.5 °C (97.7 °F)  36.5 °C (97.7 °F)   TempSrc: Temporal Temporal  Temporal   SpO2: 95% 98% 94% 94%   Weight:       Height:           Last Labs:  CBC - 12/6/2024:  6:18 PM  5.8 12.3 255    37.8      CMP - 12/6/2024:  6:18 PM  7.1 5.9 12 --- 0.2   _ 3.5 11  67      PTT - 12/7/2024:  4:54 AM  _   _ 36.7     Troponin I, High Sensitivity   Date/Time Value Ref Range Status   12/07/2024 04:54 AM 11,214 (HH) 0 - 20 ng/L Final     Comment:     Previous result verified on 12/6/2024 2017 on specimen/case [id number] called with component TRPHS for procedure Troponin, High Sensitivity, 1 Hour with value 81 ng/L.   12/07/2024 12:49 AM 2,695 (HH) 0 - 20 ng/L Final     Comment:     Previous result verified on 12/6/2024 2017 on specimen/case [id number] called with component TRPHS for procedure Troponin, High Sensitivity, 1 Hour with value 81 ng/L.   12/06/2024 07:39 PM 81 (HH) 0 - 20 ng/L Final     BNP   Date/Time Value Ref Range Status   12/06/2024 06:18 PM 27 0 - 99 pg/mL Final     Hemoglobin A1C   Date/Time Value Ref Range Status   02/23/2024 06:53 AM 6.3 (H) 4.3 - 5.6 % Final     Comment:     American Diabetes Association guidelines indicate that patients with HgbA1c in the range 5.7-6.4% are at increased risk for development of diabetes, and intervention by lifestyle modification may be beneficial. HgbA1c greater or equal to 6.5% is considered diagnostic of diabetes.   12/13/2022 08:50 AM 7.4 (H) 4.3 - 5.6 % Final     Comment:     American Diabetes Association guidelines indicate that patients with HgbA1c in the range 5.7-6.4% are at increased risk for development of diabetes, and intervention by lifestyle modification may be beneficial. HgbA1c greater or equal to 6.5% is considered diagnostic of diabetes.     LDL Calculated   Date/Time Value Ref Range Status   12/07/2024 04:54  (H) <=99 mg/dL Final     Comment:                                 Near   Borderline      AGE      Desirable  Optimal    High     High     Very High     0-19 Y     0 - 109     ---    110-129   >/= 130     ----    20-24 Y     0 - 119     ---    120-159   >/= 160     ----      >24 Y     0 -  99   100-129  130-159   160-189     >/=190       VLDL   Date/Time Value Ref Range Status   12/07/2024  04:54 AM 38 0 - 40 mg/dL Final      Results for orders placed or performed during the hospital encounter of 12/06/24 (from the past 24 hours)   CBC and Auto Differential   Result Value Ref Range    WBC 5.8 4.4 - 11.3 x10*3/uL    nRBC 0.0 0.0 - 0.0 /100 WBCs    RBC 4.37 (L) 4.50 - 5.90 x10*6/uL    Hemoglobin 12.3 (L) 13.5 - 17.5 g/dL    Hematocrit 37.8 (L) 41.0 - 52.0 %    MCV 87 80 - 100 fL    MCH 28.1 26.0 - 34.0 pg    MCHC 32.5 32.0 - 36.0 g/dL    RDW 15.1 (H) 11.5 - 14.5 %    Platelets 255 150 - 450 x10*3/uL    Neutrophils % 65.5 40.0 - 80.0 %    Immature Granulocytes %, Automated 0.7 0.0 - 0.9 %    Lymphocytes % 22.4 13.0 - 44.0 %    Monocytes % 9.4 2.0 - 10.0 %    Eosinophils % 1.5 0.0 - 6.0 %    Basophils % 0.5 0.0 - 2.0 %    Neutrophils Absolute 3.82 1.20 - 7.70 x10*3/uL    Immature Granulocytes Absolute, Automated 0.04 0.00 - 0.70 x10*3/uL    Lymphocytes Absolute 1.31 1.20 - 4.80 x10*3/uL    Monocytes Absolute 0.55 0.10 - 1.00 x10*3/uL    Eosinophils Absolute 0.09 0.00 - 0.70 x10*3/uL    Basophils Absolute 0.03 0.00 - 0.10 x10*3/uL   Comprehensive metabolic panel   Result Value Ref Range    Glucose 105 (H) 74 - 99 mg/dL    Sodium 140 136 - 145 mmol/L    Potassium 3.4 (L) 3.5 - 5.3 mmol/L    Chloride 110 (H) 98 - 107 mmol/L    Bicarbonate 21 21 - 32 mmol/L    Anion Gap 12 10 - 20 mmol/L    Urea Nitrogen 16 6 - 23 mg/dL    Creatinine 1.04 0.50 - 1.30 mg/dL    eGFR 77 >60 mL/min/1.73m*2    Calcium 7.1 (L) 8.6 - 10.3 mg/dL    Albumin 3.5 3.4 - 5.0 g/dL    Alkaline Phosphatase 67 33 - 136 U/L    Total Protein 5.9 (L) 6.4 - 8.2 g/dL    AST 12 9 - 39 U/L    Bilirubin, Total 0.2 0.0 - 1.2 mg/dL    ALT 11 10 - 52 U/L   B-Type Natriuretic Peptide   Result Value Ref Range    BNP 27 0 - 99 pg/mL   Magnesium   Result Value Ref Range    Magnesium 1.82 1.60 - 2.40 mg/dL   D-dimer, quantitative   Result Value Ref Range    D-Dimer Non VTE, Quant (mg/L FEU) 3.73 (H) 0.19 - 0.50 mg/L FEU   Troponin I, High Sensitivity, Initial    Result Value Ref Range    Troponin I, High Sensitivity 29 (H) 0 - 20 ng/L   aPTT   Result Value Ref Range    aPTT 26.9 22.0 - 32.5 seconds   Troponin, High Sensitivity, 1 Hour   Result Value Ref Range    Troponin I, High Sensitivity 81 (HH) 0 - 20 ng/L   Troponin I, High Sensitivity, Initial   Result Value Ref Range    Troponin I, High Sensitivity 2,695 (HH) 0 - 20 ng/L   POCT GLUCOSE   Result Value Ref Range    POCT Glucose 142 (H) 74 - 99 mg/dL   Lipid Panel   Result Value Ref Range    Cholesterol 197 0 - 199 mg/dL    HDL-Cholesterol 52.1 mg/dL    Cholesterol/HDL Ratio 3.8     LDL Calculated 107 (H) <=99 mg/dL    VLDL 38 0 - 40 mg/dL    Triglycerides 191 (H) 0 - 149 mg/dL    Non HDL Cholesterol 145 0 - 149 mg/dL   Troponin, High Sensitivity, 1 Hour   Result Value Ref Range    Troponin I, High Sensitivity 11,214 (HH) 0 - 20 ng/L   aPTT   Result Value Ref Range    aPTT 36.7 (H) 22.0 - 32.5 seconds       Last I/O:  I/O last 3 completed shifts:  In: 434.9 (4.1 mL/kg) [I.V.:434.9 (4.1 mL/kg)]  Out: - (0 mL/kg)   Weight: 106.6 kg     Past Cardiology Tests (Last 3 Years):  EKG: Sinus rhythm with right bundle branch block and wide QRS    Past Medical History:  He has a past medical history of Diabetes mellitus (Multi).  Obesity.  Remote tobacco use, hyperlipidemia    Past Surgical History:  He has a past surgical history that includes Abdominal surgery and Colon surgery.      Social History:  He reports that he has never smoked. He has never been exposed to tobacco smoke. He has never used smokeless tobacco. He reports current alcohol use of about 1.0 standard drink of alcohol per week. He reports that he does not use drugs.    Family History:  Significant family history for coronary artery disease with history of bypass     Allergies:  Metformin    Inpatient Medications:  Scheduled medications   Medication Dose Route Frequency    aspirin  81 mg oral Daily    aspirin  325 mg oral Once    atorvastatin  80 mg oral Nightly     insulin lispro  0-10 Units subcutaneous TID AC    polyethylene glycol  17 g oral Daily     PRN medications   Medication    acetaminophen    Or    acetaminophen    Or    acetaminophen    heparin (porcine)    nitroglycerin     Continuous Medications   Medication Dose Last Rate    heparin  0-4,000 Units/hr 1,200 Units/hr (12/07/24 0560)    potassium chloride-0.45 % NaCl  75 mL/hr 75 mL/hr (12/07/24 0011)     Outpatient Medications:  Current Outpatient Medications   Medication Instructions    lisinopriL-hydrochlorothiazide 10-12.5 mg tablet 1 tablet, Daily       Physical Exam:  General: alert, oriented x 3, very pleasant  HEENT: normal cephalic, atraumatic, no scleral icterus  Neck: No JVD, bruit or thrill, masses or tenderness   Heart: S1/S2, Rate 70, Rhythm regular, no s3 or s4, no murmur, thrill, or heaves at PMI.   Lungs: Clear, equal expansion and excursion, no wheezes, crackles, rhales or rhonci.  Room air.  No conversational dyspnea appreciated.  No tachypnea.  No pain with deep aspiration   Abdomen: Obese, bowel sounds x 4, soft, non-tender to light and deep palpation, No masses, guarding, or CVA tenderness   Genitourinary: deferred   Extremities: No significant upper or lower extremity edema appreciated.       Assessment/Plan     Acute NSTEMI  Chest pain  Obesity  Diabetes mellitus type 2 in obese  Hyperlipidemia    Overall impression:      12/7: As above, chest pain convincing for advancing coronary syndrome.  Troponins elevating from 29 up to 11,214.  Patient with recurrence of chest pain overnight and given further nitroglycerin for symptom management.  Remains on a heparin drip at this time.  In a sinus rhythm on telemetry with a stable blood pressure of 117/65.  Discussed this case with Dr. Levin who is on-call for the Cath Lab this weekend.  He agrees the patient should undergo a cardiac catheterization this morning.  I have paged the nursing supervisor and the on-call team will be notified with the  plan catheterization time of 10:00 this morning.  Ant thoroughly with the patient the risks and benefits of the procedure.  He is agreeable to proceed.  He notes that he is a full code.  Will check echocardiogram.  Further recommendations postcardiac catheterization.  12/7 Shawn addendum: The patient was seen and events reviewed in detail discussed with family.  Patient initially developed a squeezing like a blood pressure cuff around the left upper arm followed by a feeling of warmth then chest pressure and then bilateral jaw pain stereotypical for an acute coronary syndrome.  The patient's initial EKG on presentation to the emergency room did show some very slight ST elevation in the anterior precordial leads.  Patient was given sublingual nitro with improvement in his symptoms but he has had a slight recurrence this morning.  Repeat EKG from earlier this morning actually shows sinus rhythm with no ST segment abnormalities.  High-sensitivity troponins are elevated consistent with non-STEMI.  Echocardiogram performed reviewed on a preliminary basis and it appears to demonstrate some moderate hypokinesis of the distal half of the anteroseptal wall which would suggest a mid LAD stenosis potentially requiring PCI.  Patient to be taken to cardiac Cath Lab later this morning.      Code Status:  Full Code    I spent 60 minutes in the professional and overall care of this patient.        Jose Martin Mcguire, APRN-CNP

## 2024-12-07 NOTE — NURSING NOTE
Assumed care of pt, pt awake has 6/10 CP, hep gtt @ 1200, cardiology in to see pt, Pt NPO possible cath today , NSR on monitor

## 2024-12-07 NOTE — NURSING NOTE
Arrived via cart to SDU room 20 bed B, ambulated from albarado to bed, tolerated well, heparin infusing at 1000 units/hr. Oriented to room, bed in low and locked position, call light with in reach. Telemetry SRB 57.

## 2024-12-07 NOTE — ASSESSMENT & PLAN NOTE
Troponin 29 -> 11k this morning  Cardiology following   Heparin gtt  Echo ordered  Planning for cardiac cath today  NPO for procedure   Continue aspirin and statin

## 2024-12-07 NOTE — ASSESSMENT & PLAN NOTE
Very typical symptoms with troponin escalating in pattern consistent with ACS  No evidence of STEMI  Continue aspirin.  Get lipid panel and cover empirically with high-dose statin  Heparin drip has been started in the emergency department for cardiology and agree  With sinus borderline bradycardia in the 60s, will hold on beta-blockade  Echocardiogram  N.p.o. for possible left heart catheterization

## 2024-12-07 NOTE — PROGRESS NOTES
Albin Estrada is a 69 y.o. male on day 1 of admission presenting with NSTEMI (non-ST elevated myocardial infarction) (Multi).      Subjective   Family present at bedside. Patient reports intermittent chest pain throughout the night that improved with nitroglycerin. Denies chest pain currently. NPO for cath today.        Objective     Last Recorded Vitals  /79   Pulse 72   Temp 36.5 °C (97.7 °F) (Temporal)   Resp 20   Wt 107 kg (235 lb 0.2 oz)   SpO2 100%   Intake/Output last 3 Shifts:    Intake/Output Summary (Last 24 hours) at 12/7/2024 1036  Last data filed at 12/7/2024 0900  Gross per 24 hour   Intake 434.92 ml   Output --   Net 434.92 ml       Admission Weight  Weight: 106 kg (234 lb) (12/06/24 1811)    Daily Weight  12/07/24 : 107 kg (235 lb 0.2 oz)    Image Results  CT angio chest for pulmonary embolism  Narrative: Interpreted By:  Elver Garrido,   STUDY:  CT ANGIO CHEST FOR PULMONARY EMBOLISM;  12/6/2024 7:55 pm      INDICATION:  Signs/Symptoms:elevated dimer, chest pain.      COMPARISON:  None.      ACCESSION NUMBER(S):  [id number]      ORDERING CLINICIAN:  RACHEL DYER      TECHNIQUE:  Helical data acquisition of the chest was obtained after intravenous  administration of  75 mL of Omnipaque 350. Images were reformatted in  axial, coronal, and sagittal planes. Axial and coronal MIPS were  reconstructed and reviewed.      FINDINGS:  HEART AND VESSELS:  No acute pulmonary embolism to the  segmental arterial level.      Main pulmonary artery and its branches are normal in caliber.      The thoracic aorta is of normal course and caliber  scattered  vascular calcifications.      Moderate coronary artery calcifications are seen.The study is not  optimized for evaluation of coronary arteries.      The cardiac chambers are not enlarged.      Trace pericardial fluid.      MEDIASTINUM AND EMIL, LOWER NECK AND AXILLA:  The visualized thyroid gland is within normal limits.      No evidence of thoracic  lymphadenopathy by CT criteria.      Esophagus appears within normal limits as seen.      LUNGS AND AIRWAYS:  The trachea and central airways are patent. No endobronchial lesion.      Respiratory motion artifact slightly limits evaluation of the lung  parenchyma. No consolidation, effusion or pneumothorax.      UPPER ABDOMEN:  Cholelithiasis. Postsurgical changes of gastric bypass surgery.      CHEST WALL AND OSSEOUS STRUCTURES:  Multilevel degenerative changes are present.      Impression: 1. No acute pulmonary embolism to the segmental arterial level.  2. Additional findings as described above.          MACRO:  None      Signed by: Elver Garrido 12/6/2024 8:40 PM  Dictation workstation:   [id number]  XR chest 1 view  Narrative: Interpreted By:  Elver Garrido,   STUDY:  XR CHEST 1 VIEW;  12/6/2024 6:27 pm      INDICATION:  Signs/Symptoms:chest pain.      COMPARISON:  Chest x-ray 06/07/2023      ACCESSION NUMBER(S):  [id number]      ORDERING CLINICIAN:  RACHEL DYER      FINDINGS:  Multiple overlying leads are present      CARDIOMEDIASTINAL SILHOUETTE:  Cardiomediastinal silhouette is normal in size and configuration.      LUNGS:  No consolidation, pleural effusion or pneumothorax.      ABDOMEN:  No remarkable upper abdominal findings.      BONES:  Multilevel degenerative changes of the spine.      Impression: No acute cardiopulmonary process.      MACRO:  None      Signed by: Elver Garrido 12/6/2024 6:33 PM  Dictation workstation:   [id number]      Physical Exam  Constitutional:       General: He is not in acute distress.     Appearance: He is not toxic-appearing.   HENT:      Head: Normocephalic.      Mouth/Throat:      Pharynx: Oropharynx is clear.   Eyes:      General: No scleral icterus.  Cardiovascular:      Rate and Rhythm: Normal rate.   Pulmonary:      Effort: No respiratory distress.      Breath sounds: No wheezing.   Abdominal:      General: There is no distension.      Palpations: Abdomen is  soft.   Musculoskeletal:      Right lower leg: No edema.      Left lower leg: No edema.   Neurological:      Mental Status: He is alert and oriented to person, place, and time.   Psychiatric:         Behavior: Behavior normal.         Relevant Results               Assessment/Plan        Assessment & Plan  NSTEMI (non-ST elevated myocardial infarction) (Multi)  Troponin 29 -> 11k this morning  Cardiology following   Heparin gtt  Echo ordered  Planning for cardiac cath today  NPO for procedure   Continue aspirin and statin   Type 2 diabetes mellitus, without long-term current use of insulin (Multi)  HbA1c 6.3%  SSI  Hypoglycemia protocol     Plan:  Cardiac cath today  Echo ordered, follow up results  Check labs in the AM   Anticipate discharge home when cleared by cardiology               Rocío Cabral MD

## 2024-12-07 NOTE — NURSING NOTE
Cath lab team called will be here soon , pt started on NS @ 100 , pt in gown only , wife at bedside , dr Levin in room

## 2024-12-07 NOTE — ED PROVIDER NOTES
HPI   Chief Complaint   Patient presents with    Chest Pain       HPI  Patient is a 69-year-old male who presents to ED for acute chest pain that just started prior to arrival after patient ate dinner.  Patient states his pain was a 10 out of 10 initially but has somewhat improved on arrival to the ED, he reports a dull ache, mid sternum, radiates to the left arm, not made better or worse.  Denies any associated shortness of breath, nausea or vomiting.  No recent illness, hospitalizations, surgeries, travel.  Patient has no other acute complaints.  No history of MI, DVT or PE.      Patient History   History reviewed. No pertinent past medical history.  History reviewed. No pertinent surgical history.  No family history on file.  Social History     Tobacco Use    Smoking status: Not on file    Smokeless tobacco: Not on file   Substance Use Topics    Alcohol use: Not on file    Drug use: Not on file       Physical Exam   ED Triage Vitals [12/06/24 1811]   Temperature Heart Rate Respirations BP   36.6 °C (97.9 °F) 63 18 138/68      Pulse Ox Temp Source Heart Rate Source Patient Position   99 % Oral Monitor --      BP Location FiO2 (%)     Right arm --       Physical Exam  Vitals reviewed.   Constitutional:       General: He is not in acute distress.     Appearance: Normal appearance. He is well-developed. He is ill-appearing.   HENT:      Head: Normocephalic and atraumatic.   Eyes:      Extraocular Movements: Extraocular movements intact.   Cardiovascular:      Rate and Rhythm: Normal rate and regular rhythm.      Heart sounds: Normal heart sounds.   Pulmonary:      Effort: Pulmonary effort is normal.      Breath sounds: Normal breath sounds.   Abdominal:      General: Abdomen is flat.   Musculoskeletal:         General: Normal range of motion.      Cervical back: Normal range of motion and neck supple.   Skin:     General: Skin is warm and dry.   Neurological:      Mental Status: He is alert and oriented to person,  "place, and time.   Psychiatric:         Mood and Affect: Mood normal.         Behavior: Behavior normal.           ED Course & MDM   Diagnoses as of 12/06/24 2106   NSTEMI (non-ST elevated myocardial infarction) (Multi)                 No data recorded                                 Medical Decision Making  Parts of this chart have been completed using voice recognition software. Please excuse any errors of transcription.  My thought process and reason for plan has been formulated from the time that I saw the patient until the time of disposition and is not specific to one specific moment during their visit and furthermore my MDM encompasses this entire chart and not only this text box.    HPI:   A medically appropriate HPI was obtained, outlined above.    Albin Estrada is a  69 y.o. male    Chief Complaint   Patient presents with    Chest Pain       History reviewed. No pertinent past medical history.    History reviewed. No pertinent surgical history.         No family history on file.    Allergies   Allergen Reactions    Metformin Headache       No current outpatient medications    History obtained from:   Patient    Exam:   Patient Vitals for the past 24 hrs:   BP Temp Temp src Pulse Resp SpO2 Height Weight   12/06/24 1930 114/68 -- -- 63 12 97 % -- --   12/06/24 1811 138/68 36.6 °C (97.9 °F) Oral 63 18 99 % 1.676 m (5' 6\") 106 kg (234 lb)       A medically appropriate exam performed, outlined above, given the known history and presentation.    EKG/Cardiac monitor:   Interpreted by attending physician, see their note for ED course for more detail.    Social Determinants of Health considered during this visit:   Housing, Family or social support    Medications given during visit:  Medications   aspirin tablet 325 mg (325 mg oral Not Given 12/6/24 1845)   heparin (porcine) injection 4,000 Units (has no administration in time range)   heparin 25,000 Units in dextrose 5% 250 mL (100 Units/mL) infusion (premix) " (has no administration in time range)   heparin (porcine) injection 2,000-4,000 Units (has no administration in time range)   HYDROmorphone (Dilaudid) injection 0.5 mg (has no administration in time range)   famotidine PF (Pepcid) injection 20 mg (20 mg intravenous Given 12/6/24 1849)   ondansetron (Zofran) injection 4 mg (4 mg intravenous Given 12/6/24 1849)   morphine injection 4 mg (4 mg intravenous Given 12/6/24 1849)   iohexol (OMNIPaque) 350 mg iodine/mL solution 75 mL (75 mL intravenous Given 12/6/24 1952)        Diagnostic/tests:  Labs Reviewed   CBC WITH AUTO DIFFERENTIAL - Abnormal       Result Value    WBC 5.8      nRBC 0.0      RBC 4.37 (*)     Hemoglobin 12.3 (*)     Hematocrit 37.8 (*)     MCV 87      MCH 28.1      MCHC 32.5      RDW 15.1 (*)     Platelets 255      Neutrophils % 65.5      Immature Granulocytes %, Automated 0.7      Lymphocytes % 22.4      Monocytes % 9.4      Eosinophils % 1.5      Basophils % 0.5      Neutrophils Absolute 3.82      Immature Granulocytes Absolute, Automated 0.04      Lymphocytes Absolute 1.31      Monocytes Absolute 0.55      Eosinophils Absolute 0.09      Basophils Absolute 0.03     COMPREHENSIVE METABOLIC PANEL - Abnormal    Glucose 105 (*)     Sodium 140      Potassium 3.4 (*)     Chloride 110 (*)     Bicarbonate 21      Anion Gap 12      Urea Nitrogen 16      Creatinine 1.04      eGFR 77      Calcium 7.1 (*)     Albumin 3.5      Alkaline Phosphatase 67      Total Protein 5.9 (*)     AST 12      Bilirubin, Total 0.2      ALT 11     D-DIMER, NON VTE - Abnormal    D-Dimer Non VTE, Quant (mg/L FEU) 3.73 (*)     Narrative:     THROMBOEMBOLIC EVENTS CANNOT BE EXCLUDED SOLELY ON THE BASIS OF THE D-DIMER LEVEL BEING WITHIN THE NORMAL REFERENCE RANGE. D-DIMER LEVELS LESS THAN 0.5 MG/L FEU IN CONJUNCTION WITH A LOW CLINICAL PROBABILITY HAVE AN EXCELLENT NEGATIVE PREDICTIVE VALUE IN EXCLUDING A DIAGNOSIS OF PULMONARY EMBOLUS (PE) OR DEEP VEIN THROMBOSIS (DVT). ELEVATED  D-DIMER LEVELS ARE NOT SPECIFIC TO PE OR DVT, AND MAY BE SEEN IN PATIENTS WITH DIC, ADVANCED AGE, PREGNANCY, MALIGNANCY, LIVER DISEASE, INFECTION, AND INFLAMMATORY CONDITIONS AMONG OTHERS. D-DIMER LEVELS MAY BE DECREASED IN PATIENTS RECEIVING ANTI-COAGULATION THERAPY.   SERIAL TROPONIN-INITIAL - Abnormal    Troponin I, High Sensitivity 29 (*)     Narrative:     Less than 99th percentile of normal range cutoff-  Female and children under 18 years old <14 ng/L; Male <21 ng/L: Negative  Repeat testing should be performed if clinically indicated.     Female and children under 18 years old 14-50 ng/L; Male 21-50 ng/L:  Consistent with possible cardiac damage and possible increased clinical   risk. Serial measurements may help to assess extent of myocardial damage.     >50 ng/L: Consistent with cardiac damage, increased clinical risk and  myocardial infarction. Serial measurements may help assess extent of   myocardial damage.      NOTE: Children less than 1 year old may have higher baseline troponin   levels and results should be interpreted in conjunction with the overall   clinical context.     NOTE: Troponin I testing is performed using a different   testing methodology at [facility] than at other   [facility]. Direct result comparisons should only   be made within the same method.   SERIAL TROPONIN, 1 HOUR - Abnormal    Troponin I, High Sensitivity 81 (*)     Narrative:     Less than 99th percentile of normal range cutoff-  Female and children under 18 years old <14 ng/L; Male <21 ng/L: Negative  Repeat testing should be performed if clinically indicated.     Female and children under 18 years old 14-50 ng/L; Male 21-50 ng/L:  Consistent with possible cardiac damage and possible increased clinical   risk. Serial measurements may help to assess extent of myocardial damage.     >50 ng/L: Consistent with cardiac damage, increased clinical risk and  myocardial infarction. Serial measurements may help  assess extent of   myocardial damage.      NOTE: Children less than 1 year old may have higher baseline troponin   levels and results should be interpreted in conjunction with the overall   clinical context.     NOTE: Troponin I testing is performed using a different   testing methodology at [facility] than at other   [facility]. Direct result comparisons should only   be made within the same method.   B-TYPE NATRIURETIC PEPTIDE - Normal    BNP 27      Narrative:        <100 pg/mL - Heart failure unlikely  100-299 pg/mL - Intermediate probability of acute heart                  failure exacerbation. Correlate with clinical                  context and patient history.    >=300 pg/mL - Heart Failure likely. Correlate with clinical                  context and patient history.    BNP testing is performed using different testing methodology at [facility] than at other [facility] hospitals. Direct result comparisons should only be made within the same method.      MAGNESIUM - Normal    Magnesium 1.82     TROPONIN SERIES- (INITIAL, 1 HR)    Narrative:     The following orders were created for panel order Troponin I Series, High Sensitivity (0, 1 HR).  Procedure                               Abnormality         Status                     ---------                               -----------         ------                     Troponin I, High Sensiti...[[id number]]  Abnormal            Final result               Troponin, High Sensitivi...[[id number]]  Abnormal            Final result                 Please view results for these tests on the individual orders.   APTT        CT angio chest for pulmonary embolism   Final Result   1. No acute pulmonary embolism to the segmental arterial level.   2. Additional findings as described above.             MACRO:   None        Signed by: Elver Garrido 12/6/2024 8:40 PM   Dictation workstation:   [id number]      XR chest 1 view   Final Result   No acute  cardiopulmonary process.        MACRO:   None        Signed by: Elver Garrido 12/6/2024 6:33 PM   Dictation workstation:   [id number]           Differential:  As I have deemed necessary from their history, physical, and studies, I have considered the following diagnoses:     ACUTE MYOCARDIAL INFARCTION  PULMONARY EMBOLISM  AORTIC DISSECTION  PERICARDITIS  PNEUMOTHORAX  PNEUMONIA  COSTOCHONDRITIS OR MSK PAIN  PERFORATED PEPTIC ULCER  ESOPHAGEAL RUPTURE  GERD  CHOLECYSTITIS    Critical Care:  Upon my evaluation, this patient had a high probability of imminent or life-threatening deterioration due to NSTEMI, which required my direct attention, intervention, and personal management.    I have personally provided 30 minutes of non-concurrent critical care time exclusive of time spent on separately billable procedures. Time includes review of laboratory data, radiology results, discussion with consultants, and monitoring for potential decompensation. Interventions were performed as documented above.    MDM Summary:  Positive delta troponin concerning for NSTEMI.  Elevated D-dimer but no evidence of PE.  Cardiology was consulted and heparin protocol was initiated.  Patient still complaining of moderate to severe chest pain and was given IV Dilaudid.  Lab work otherwise noncontributory.  I spoke with the admitting physician Dr. Reed. We thoroughly discussed the patient's medical history, physical exam, laboratory and imaging studies, as well as, emergency department course. This also includes the patient's comorbidities, surgical history, medications, and living situation. Based upon that discussion, we've decided to admit for further management and evaluation of their NSTEMI. The patient will be admitted to stepdown unit as determined by myself and the admitting physician. The admitting physician has been fully informed regarding this case and agreed to place the admission order for the patient. Please see the  admission H&P authored by Dr. Reed for further detail.    Disposition:  ED Prescriptions    None           Procedure  Procedures     Venkata Arizmendi PA-C  12/07/24 0031

## 2024-12-07 NOTE — NURSING NOTE
Pt arrived back from cath lab , 1 stent placed in LAD, pt hooked to hardwire , wife at bedside TR band placed on right wrist 12cc @ 1125

## 2024-12-07 NOTE — NURSING NOTE
Four Eye Skin Check completed by Gaston Perdomo and Melani Guerra.      No Acute findings noted and the following was completed.

## 2024-12-07 NOTE — H&P
"History Of Present Illness  Albin Estrada is a 69 y.o. male presenting with chest pain.  Patient follows mostly with [facility], and has a more recent history of a 2023 sigmoid diverticulosis with abscess and perforation, requiring colostomy with reversal in February of this year which was successful.  Tonight, patient was eating dinner and soon after, developed a \"tightness in the left arm,, then felt generally \"hot\" with chest heaviness developing into shortness of breath, and endorses left jaw pain.  It took some convincing, but he did agree to come the emergency department, and he was given 2 nitro, with the second nitroglycerin providing relief, though still having some tightness in the chest and left elbow.  Patient has a remote stress test but no left heart catheterization.  Extensive family history including a father with coronary artery bypass graft and age 40s, and a mother with coronary disease and age 60s.  Brother also has coronary disease and has had a left heart catheterization  Past Medical History  He has no past medical history on file.    Surgical History  He has no past surgical history on file.     Social History  He has no history on file for tobacco use, alcohol use, and drug use.    Family History  No family history on file.     Allergies  Metformin    Review of Systems  Per HPI  Physical Exam  General: Middle-aged to elderly male, nontoxic, obese  Eyes: Clear sclera  Neck: No JVD on cardio: Regular rate rhythm.  Heart rate in the 60s.  No murmurs.  Respiratory: Room air.  Clear to auscultation.  Abdomen: Protuberant abdomen.  Healed surgical scars.  Extremities: Trace bilateral lower extremity  Skin: Warm dry  Neuro: Oriented x 3.  No facial droop.  Psychiatric: Appropriate mood and affect  Last Recorded Vitals  /80   Pulse 64   Temp 36.6 °C (97.9 °F) (Oral)   Resp 15   Wt 106 kg (234 lb)   SpO2 97%     Relevant Results        Results for orders placed or performed during the hospital " encounter of 12/06/24 (from the past 24 hours)   CBC and Auto Differential   Result Value Ref Range    WBC 5.8 4.4 - 11.3 x10*3/uL    nRBC 0.0 0.0 - 0.0 /100 WBCs    RBC 4.37 (L) 4.50 - 5.90 x10*6/uL    Hemoglobin 12.3 (L) 13.5 - 17.5 g/dL    Hematocrit 37.8 (L) 41.0 - 52.0 %    MCV 87 80 - 100 fL    MCH 28.1 26.0 - 34.0 pg    MCHC 32.5 32.0 - 36.0 g/dL    RDW 15.1 (H) 11.5 - 14.5 %    Platelets 255 150 - 450 x10*3/uL    Neutrophils % 65.5 40.0 - 80.0 %    Immature Granulocytes %, Automated 0.7 0.0 - 0.9 %    Lymphocytes % 22.4 13.0 - 44.0 %    Monocytes % 9.4 2.0 - 10.0 %    Eosinophils % 1.5 0.0 - 6.0 %    Basophils % 0.5 0.0 - 2.0 %    Neutrophils Absolute 3.82 1.20 - 7.70 x10*3/uL    Immature Granulocytes Absolute, Automated 0.04 0.00 - 0.70 x10*3/uL    Lymphocytes Absolute 1.31 1.20 - 4.80 x10*3/uL    Monocytes Absolute 0.55 0.10 - 1.00 x10*3/uL    Eosinophils Absolute 0.09 0.00 - 0.70 x10*3/uL    Basophils Absolute 0.03 0.00 - 0.10 x10*3/uL   Comprehensive metabolic panel   Result Value Ref Range    Glucose 105 (H) 74 - 99 mg/dL    Sodium 140 136 - 145 mmol/L    Potassium 3.4 (L) 3.5 - 5.3 mmol/L    Chloride 110 (H) 98 - 107 mmol/L    Bicarbonate 21 21 - 32 mmol/L    Anion Gap 12 10 - 20 mmol/L    Urea Nitrogen 16 6 - 23 mg/dL    Creatinine 1.04 0.50 - 1.30 mg/dL    eGFR 77 >60 mL/min/1.73m*2    Calcium 7.1 (L) 8.6 - 10.3 mg/dL    Albumin 3.5 3.4 - 5.0 g/dL    Alkaline Phosphatase 67 33 - 136 U/L    Total Protein 5.9 (L) 6.4 - 8.2 g/dL    AST 12 9 - 39 U/L    Bilirubin, Total 0.2 0.0 - 1.2 mg/dL    ALT 11 10 - 52 U/L   B-Type Natriuretic Peptide   Result Value Ref Range    BNP 27 0 - 99 pg/mL   Magnesium   Result Value Ref Range    Magnesium 1.82 1.60 - 2.40 mg/dL   D-dimer, quantitative   Result Value Ref Range    D-Dimer Non VTE, Quant (mg/L FEU) 3.73 (H) 0.19 - 0.50 mg/L FEU   Troponin I, High Sensitivity, Initial   Result Value Ref Range    Troponin I, High Sensitivity 29 (H) 0 - 20 ng/L   Troponin, High  Sensitivity, 1 Hour   Result Value Ref Range    Troponin I, High Sensitivity 81 (HH) 0 - 20 ng/L         Assessment/Plan   Assessment & Plan  NSTEMI (non-ST elevated myocardial infarction) (Multi)  Very typical symptoms with troponin escalating in pattern consistent with ACS  No evidence of STEMI  Continue aspirin.  Get lipid panel and cover empirically with high-dose statin  Heparin drip has been started in the emergency department for cardiology and agree  With sinus borderline bradycardia in the 60s, will hold on beta-blockade  Echocardiogram  N.p.o. for possible left heart catheterization    Type 2 diabetes mellitus, without long-term current use of insulin (Multi)  Diet controlled, last A1c per patient was 6.3.  Sign scale while inpatient  Greater than 45 minutes in discussion, review, evaluation, formulation of plan         Chauncey Reed DO

## 2024-12-08 VITALS
HEIGHT: 66 IN | OXYGEN SATURATION: 96 % | SYSTOLIC BLOOD PRESSURE: 119 MMHG | HEART RATE: 70 BPM | RESPIRATION RATE: 18 BRPM | TEMPERATURE: 98.1 F | DIASTOLIC BLOOD PRESSURE: 60 MMHG | WEIGHT: 240.3 LBS | BODY MASS INDEX: 38.62 KG/M2

## 2024-12-08 PROBLEM — I21.4 NSTEMI (NON-ST ELEVATED MYOCARDIAL INFARCTION) (MULTI): Status: RESOLVED | Noted: 2024-12-06 | Resolved: 2024-12-08

## 2024-12-08 LAB
ALBUMIN SERPL BCP-MCNC: 3.5 G/DL (ref 3.4–5)
ANION GAP SERPL CALCULATED.3IONS-SCNC: 9 MMOL/L (ref 10–20)
BASOPHILS # BLD AUTO: 0.03 X10*3/UL (ref 0–0.1)
BASOPHILS NFR BLD AUTO: 0.5 %
BUN SERPL-MCNC: 16 MG/DL (ref 6–23)
CALCIUM SERPL-MCNC: 8.2 MG/DL (ref 8.6–10.3)
CHLORIDE SERPL-SCNC: 104 MMOL/L (ref 98–107)
CO2 SERPL-SCNC: 28 MMOL/L (ref 21–32)
CREAT SERPL-MCNC: 0.98 MG/DL (ref 0.5–1.3)
EGFRCR SERPLBLD CKD-EPI 2021: 83 ML/MIN/1.73M*2
EOSINOPHIL # BLD AUTO: 0.13 X10*3/UL (ref 0–0.7)
EOSINOPHIL NFR BLD AUTO: 2.1 %
ERYTHROCYTE [DISTWIDTH] IN BLOOD BY AUTOMATED COUNT: 15.2 % (ref 11.5–14.5)
GLUCOSE BLD MANUAL STRIP-MCNC: 118 MG/DL (ref 74–99)
GLUCOSE BLD MANUAL STRIP-MCNC: 123 MG/DL (ref 74–99)
GLUCOSE BLD MANUAL STRIP-MCNC: 133 MG/DL (ref 74–99)
GLUCOSE BLD MANUAL STRIP-MCNC: 145 MG/DL (ref 74–99)
GLUCOSE SERPL-MCNC: 134 MG/DL (ref 74–99)
HCT VFR BLD AUTO: 34.2 % (ref 41–52)
HGB BLD-MCNC: 11.3 G/DL (ref 13.5–17.5)
IMM GRANULOCYTES # BLD AUTO: 0.03 X10*3/UL (ref 0–0.7)
IMM GRANULOCYTES NFR BLD AUTO: 0.5 % (ref 0–0.9)
LYMPHOCYTES # BLD AUTO: 0.84 X10*3/UL (ref 1.2–4.8)
LYMPHOCYTES NFR BLD AUTO: 13.8 %
MCH RBC QN AUTO: 27.6 PG (ref 26–34)
MCHC RBC AUTO-ENTMCNC: 33 G/DL (ref 32–36)
MCV RBC AUTO: 83 FL (ref 80–100)
MONOCYTES # BLD AUTO: 0.58 X10*3/UL (ref 0.1–1)
MONOCYTES NFR BLD AUTO: 9.5 %
NEUTROPHILS # BLD AUTO: 4.48 X10*3/UL (ref 1.2–7.7)
NEUTROPHILS NFR BLD AUTO: 73.6 %
NRBC BLD-RTO: 0 /100 WBCS (ref 0–0)
PHOSPHATE SERPL-MCNC: 3.1 MG/DL (ref 2.5–4.9)
PLATELET # BLD AUTO: 213 X10*3/UL (ref 150–450)
POTASSIUM SERPL-SCNC: 4.2 MMOL/L (ref 3.5–5.3)
RBC # BLD AUTO: 4.1 X10*6/UL (ref 4.5–5.9)
SODIUM SERPL-SCNC: 137 MMOL/L (ref 136–145)
WBC # BLD AUTO: 6.1 X10*3/UL (ref 4.4–11.3)

## 2024-12-08 PROCEDURE — 80069 RENAL FUNCTION PANEL: CPT | Performed by: INTERNAL MEDICINE

## 2024-12-08 PROCEDURE — 82947 ASSAY GLUCOSE BLOOD QUANT: CPT

## 2024-12-08 PROCEDURE — 2500000002 HC RX 250 W HCPCS SELF ADMINISTERED DRUGS (ALT 637 FOR MEDICARE OP, ALT 636 FOR OP/ED): Performed by: INTERNAL MEDICINE

## 2024-12-08 PROCEDURE — 99232 SBSQ HOSP IP/OBS MODERATE 35: CPT

## 2024-12-08 PROCEDURE — 2500000001 HC RX 250 WO HCPCS SELF ADMINISTERED DRUGS (ALT 637 FOR MEDICARE OP)

## 2024-12-08 PROCEDURE — 2500000004 HC RX 250 GENERAL PHARMACY W/ HCPCS (ALT 636 FOR OP/ED): Performed by: INTERNAL MEDICINE

## 2024-12-08 PROCEDURE — 99239 HOSP IP/OBS DSCHRG MGMT >30: CPT

## 2024-12-08 PROCEDURE — 85025 COMPLETE CBC W/AUTO DIFF WBC: CPT | Performed by: STUDENT IN AN ORGANIZED HEALTH CARE EDUCATION/TRAINING PROGRAM

## 2024-12-08 PROCEDURE — 2500000001 HC RX 250 WO HCPCS SELF ADMINISTERED DRUGS (ALT 637 FOR MEDICARE OP): Performed by: INTERNAL MEDICINE

## 2024-12-08 RX ORDER — METOPROLOL SUCCINATE 50 MG/1
50 TABLET, EXTENDED RELEASE ORAL DAILY
Qty: 90 TABLET | Refills: 0 | Status: SHIPPED | OUTPATIENT
Start: 2024-12-09

## 2024-12-08 RX ORDER — LOSARTAN POTASSIUM 50 MG/1
50 TABLET ORAL DAILY
Status: DISCONTINUED | OUTPATIENT
Start: 2024-12-08 | End: 2024-12-08 | Stop reason: HOSPADM

## 2024-12-08 RX ORDER — NAPROXEN SODIUM 220 MG/1
81 TABLET, FILM COATED ORAL DAILY
Qty: 30 TABLET | Refills: 0 | Status: SHIPPED | OUTPATIENT
Start: 2024-12-09 | End: 2025-02-07

## 2024-12-08 RX ORDER — ROSUVASTATIN CALCIUM 20 MG/1
40 TABLET, COATED ORAL NIGHTLY
Status: DISCONTINUED | OUTPATIENT
Start: 2024-12-08 | End: 2024-12-08

## 2024-12-08 RX ORDER — ROSUVASTATIN CALCIUM 20 MG/1
20 TABLET, COATED ORAL NIGHTLY
Status: DISCONTINUED | OUTPATIENT
Start: 2024-12-08 | End: 2024-12-08 | Stop reason: HOSPADM

## 2024-12-08 RX ORDER — METOPROLOL SUCCINATE 50 MG/1
50 TABLET, EXTENDED RELEASE ORAL DAILY
Status: DISCONTINUED | OUTPATIENT
Start: 2024-12-08 | End: 2024-12-08 | Stop reason: HOSPADM

## 2024-12-08 RX ORDER — ROSUVASTATIN CALCIUM 20 MG/1
20 TABLET, COATED ORAL NIGHTLY
Qty: 90 TABLET | Refills: 0 | Status: SHIPPED | OUTPATIENT
Start: 2024-12-08

## 2024-12-08 RX ORDER — LOSARTAN POTASSIUM 50 MG/1
50 TABLET ORAL DAILY
Qty: 90 TABLET | Refills: 0 | Status: SHIPPED | OUTPATIENT
Start: 2024-12-09

## 2024-12-08 ASSESSMENT — PAIN SCALES - GENERAL: PAINLEVEL_OUTOF10: 0 - NO PAIN

## 2024-12-08 ASSESSMENT — PAIN - FUNCTIONAL ASSESSMENT: PAIN_FUNCTIONAL_ASSESSMENT: 0-10

## 2024-12-08 NOTE — DISCHARGE INSTRUCTIONS
Follow-up with primary care provider within 1 week of discharge  Follow-up with Dr. Levin (cardiology) in 2 to 3 weeks as instructed  Take medications as directed   Stop taking lisinopril-hydrochlorothiazide  Start the following medications:  - Aspirin 81 mg daily  - Losartan 50 mg daily  - Metoprolol succinate 50 mg daily  - Rosuvastatin 20 mg daily at bedtime  - ticagrelor (brilinta) 90 m tab twice daily

## 2024-12-08 NOTE — CARE PLAN
The patient's goals for the shift include pt will remain free of falls and injuries    The clinical goals for the shift include no chest pain      Problem: Diabetes  Goal: Achieve decreasing blood glucose levels by end of shift  Outcome: Progressing  Goal: Increase stability of blood glucose readings by end of shift  Outcome: Progressing  Goal: Decrease in ketones present in urine by end of shift  Outcome: Progressing  Goal: Maintain electrolyte levels within acceptable range throughout shift  Outcome: Progressing  Goal: Maintain glucose levels >70mg/dl to <250mg/dl throughout shift  Outcome: Progressing  Goal: No changes in neurological exam by end of shift  Outcome: Progressing  Goal: Learn about and adhere to nutrition recommendations by end of shift  Outcome: Progressing  Goal: Vital signs within normal range for age by end of shift  Outcome: Progressing  Goal: Increase self care and/or family involovement by end of shift  Outcome: Progressing  Goal: Receive DSME education by end of shift  Outcome: Progressing     Problem: Pain  Goal: Takes deep breaths with improved pain control throughout the shift  Outcome: Progressing  Goal: Turns in bed with improved pain control throughout the shift  Outcome: Progressing  Goal: Walks with improved pain control throughout the shift  Outcome: Progressing  Goal: Performs ADL's with improved pain control throughout shift  Outcome: Progressing  Goal: Participates in PT with improved pain control throughout the shift  Outcome: Progressing  Goal: Free from opioid side effects throughout the shift  Outcome: Progressing  Goal: Free from acute confusion related to pain meds throughout the shift  Outcome: Progressing

## 2024-12-08 NOTE — PROGRESS NOTES
"Albin Estrada is a 69 y.o. male on day 2 of admission presenting with NSTEMI (non-ST elevated myocardial infarction) (Multi).    Subjective   Patient sitting upright on side of bed.  He denies chest pain, shortness of breath or palpitations.  He is expressing a urgent desire to go home today.       Objective     Physical Exam  Constitutional:       Appearance: He is obese. He is not ill-appearing.   Cardiovascular:      Rate and Rhythm: Normal rate and regular rhythm.      Pulses: Normal pulses.      Heart sounds: Normal heart sounds.   Pulmonary:      Effort: Pulmonary effort is normal.      Breath sounds: Normal breath sounds.   Abdominal:      General: Abdomen is flat.      Palpations: Abdomen is soft.   Musculoskeletal:      Right lower leg: Edema present.      Left lower leg: Edema present.   Skin:     General: Skin is warm and dry.   Neurological:      Mental Status: He is alert and oriented to person, place, and time.         Last Recorded Vitals  Blood pressure 144/67, pulse 70, temperature 36.5 °C (97.7 °F), resp. rate 18, height 1.676 m (5' 6\"), weight 109 kg (240 lb 4.8 oz), SpO2 96%.  Intake/Output last 3 Shifts:  I/O last 3 completed shifts:  In: 1634.9 (15 mL/kg) [P.O.:400; I.V.:1234.9 (11.3 mL/kg)]  Out: 30 (0.3 mL/kg) [Blood:30]  Weight: 109 kg     Relevant Results  Results for orders placed or performed during the hospital encounter of 12/06/24 (from the past 24 hours)   Transthoracic Echo (TTE) Complete   Result Value Ref Range    AV pk kamille 1.32 m/s    LVOT diam 2.10 cm    AV mn grad 3 mmHg    MV E/A ratio 1.12     Tricuspid annular plane systolic excursion 2.5 cm    LV Biplane EF 62 %    LA vol index A/L 44.4 ml/m2    MV avg E/e' ratio 2.52     LV EF 58 %    RV free wall pk S' 16.96 cm/s    LVIDd 5.08 cm    AV pk grad 7 mmHg    Aortic Valve Area by Continuity of VTI 2.59 cm2    Aortic Valve Area by Continuity of Peak Velocity 2.20 cm2    LV A4C EF 61.7    POCT GLUCOSE   Result Value Ref Range "    POCT Glucose 125 (H) 74 - 99 mg/dL   POCT GLUCOSE   Result Value Ref Range    POCT Glucose 136 (H) 74 - 99 mg/dL   POCT GLUCOSE   Result Value Ref Range    POCT Glucose 145 (H) 74 - 99 mg/dL   CBC and Auto Differential   Result Value Ref Range    WBC 6.1 4.4 - 11.3 x10*3/uL    nRBC 0.0 0.0 - 0.0 /100 WBCs    RBC 4.10 (L) 4.50 - 5.90 x10*6/uL    Hemoglobin 11.3 (L) 13.5 - 17.5 g/dL    Hematocrit 34.2 (L) 41.0 - 52.0 %    MCV 83 80 - 100 fL    MCH 27.6 26.0 - 34.0 pg    MCHC 33.0 32.0 - 36.0 g/dL    RDW 15.2 (H) 11.5 - 14.5 %    Platelets 213 150 - 450 x10*3/uL    Neutrophils % 73.6 40.0 - 80.0 %    Immature Granulocytes %, Automated 0.5 0.0 - 0.9 %    Lymphocytes % 13.8 13.0 - 44.0 %    Monocytes % 9.5 2.0 - 10.0 %    Eosinophils % 2.1 0.0 - 6.0 %    Basophils % 0.5 0.0 - 2.0 %    Neutrophils Absolute 4.48 1.20 - 7.70 x10*3/uL    Immature Granulocytes Absolute, Automated 0.03 0.00 - 0.70 x10*3/uL    Lymphocytes Absolute 0.84 (L) 1.20 - 4.80 x10*3/uL    Monocytes Absolute 0.58 0.10 - 1.00 x10*3/uL    Eosinophils Absolute 0.13 0.00 - 0.70 x10*3/uL    Basophils Absolute 0.03 0.00 - 0.10 x10*3/uL   Renal Function Panel   Result Value Ref Range    Glucose 134 (H) 74 - 99 mg/dL    Sodium 137 136 - 145 mmol/L    Potassium 4.2 3.5 - 5.3 mmol/L    Chloride 104 98 - 107 mmol/L    Bicarbonate 28 21 - 32 mmol/L    Anion Gap 9 (L) 10 - 20 mmol/L    Urea Nitrogen 16 6 - 23 mg/dL    Creatinine 0.98 0.50 - 1.30 mg/dL    eGFR 83 >60 mL/min/1.73m*2    Calcium 8.2 (L) 8.6 - 10.3 mg/dL    Phosphorus 3.1 2.5 - 4.9 mg/dL    Albumin 3.5 3.4 - 5.0 g/dL   POCT GLUCOSE   Result Value Ref Range    POCT Glucose 123 (H) 74 - 99 mg/dL         Assessment/Plan   Assessment & Plan  NSTEMI (non-ST elevated myocardial infarction) (Multi)    Type 2 diabetes mellitus, without long-term current use of insulin (Multi)    Acute NSTEMI  Chest pain  Obesity  Diabetes mellitus type 2 in obese  Hyperlipidemia     Overall impression:        12/7: As above,  chest pain convincing for advancing coronary syndrome.  Troponins elevating from 29 up to 11,214.  Patient with recurrence of chest pain overnight and given further nitroglycerin for symptom management.  Remains on a heparin drip at this time.  In a sinus rhythm on telemetry with a stable blood pressure of 117/65.  Discussed this case with Dr. Levin who is on-call for the Cath Lab this weekend.  He agrees the patient should undergo a cardiac catheterization this morning.  I have paged the nursing supervisor and the on-call team will be notified with the plan catheterization time of 10:00 this morning.  Ant thoroughly with the patient the risks and benefits of the procedure.  He is agreeable to proceed.  He notes that he is a full code.  Will check echocardiogram.  Further recommendations postcardiac catheterization.  12/7 Shawn addendum: The patient was seen and events reviewed in detail discussed with family.  Patient initially developed a squeezing like a blood pressure cuff around the left upper arm followed by a feeling of warmth then chest pressure and then bilateral jaw pain stereotypical for an acute coronary syndrome.  The patient's initial EKG on presentation to the emergency room did show some very slight ST elevation in the anterior precordial leads.  Patient was given sublingual nitro with improvement in his symptoms but he has had a slight recurrence this morning.  Repeat EKG from earlier this morning actually shows sinus rhythm with no ST segment abnormalities.  High-sensitivity troponins are elevated consistent with non-STEMI.  Echocardiogram performed reviewed on a preliminary basis and it appears to demonstrate some moderate hypokinesis of the distal half of the anteroseptal wall which would suggest a mid LAD stenosis potentially requiring PCI.  Patient to be taken to cardiac Cath Lab later this morning.    12/8: As above.  Patient underwent cardiac catheterization yesterday resulting in a  drug-eluting stent to the mid LAD.  Patient was started on aspirin and Brilinta for dual antiplatelet therapy.  Patient has not had any further episodes of chest pain.  He denies shortness of breath.  Patient states he is able to ambulate to the restroom without any difficulties.  Wife is at bedside, is anxious about his return home. Ideally patient would have his Brilinta delivered to his bedside using the meds-to beds program, as it is Sunday and the retail pharmacy is not open this will not be feasible.  Patient will have to use his home pharmacy of [facility].  Educated on the importance of taking this medication as prescribed.  And the risks of potential stent thrombosis if he is noncompliant.  Overall patient is stable from a cardiac perspective.  He is on the hypertensive side this morning although he had not yet had his blood pressure medicines.  He remains on room air with saturations at 96%.  He continues in normal sinus rhythm on telemetry without significant ectopy.  Lab work this morning shows a sodium of 137, potassium of 4.2, creatinine 0.98, hemoglobin 11.3 with hematocrit of 34.2. Will discuss further disposition with Dr. Escobar.        I spent 20 minutes in the professional and overall care of this patient.      Katie Brown PA-C

## 2024-12-08 NOTE — CARE PLAN
The patient's goals for the shift include      The clinical goals for the shift include remain CP free    Over the shift, the patient did not make progress toward the following goals. Barriers to progression include admittecd with CP. Recommendations to address these barriers include monitor for CP and medicated with prescribed medications, obtain ECG and monitor telemetry.

## 2024-12-08 NOTE — DISCHARGE SUMMARY
Discharge Diagnosis  NSTEMI (non-ST elevated myocardial infarction) (Multi)    Issues Requiring Follow-Up  Follow-up with primary care provider within 1 week of discharge  Follow-up with Dr. Levin (cardiology) in 2 to 3 weeks as instructed  Take medications as directed   Stop taking lisinopril-hydrochlorothiazide  Start the following medications:  - Aspirin 81 mg daily  - Losartan 50 mg daily  - Metoprolol succinate 50 mg daily  - Rosuvastatin 20 mg daily at bedtime  - ticagrelor (brilinta) 90 m tab twice daily   Discharge Meds     Medication List      ASK your doctor about these medications     lisinopriL-hydrochlorothiazide 10-12.5 mg tablet       Test Results Pending At Discharge  Pending Labs       No current pending labs.            Hospital Course  69 YOM presenting to ED C/O Chest pain/heaviness with radiation down LUE.  No ST elevations on EKG, but troponin uptrended from 29-> 11,214.  Started on heparin gtt.  Remained HDS and chest pain free. Underwent PCI to Mid-LAD with TRAM. Started on DAPT: ASA and brilinta. Discharged on DAPT, BB, losartan, and crestor. To follow up with cardiology, Dr. Levin in 2-3 weeks.  To follow up with PCP within one week of discharge.   Remained chest pain free post-cath, and echo showed normal EF. Medically appropriate for discharge with close followup with cardiology.    Pertinent Physical Exam At Time of Discharge  Physical Exam  Constitutional: A&Ox4, NAD, resting comfortable   Head and Face: Atraumatic, normocephalic   Eyes: Normal external exam, EOMI  ENT: Normal external inspection of ears and nose. Oropharynx normal.  Cardiovascular: RRR, S1/S2, no murmurs, rubs, or gallops, radial pulses +2  Pulmonary: CTAB, no respiratory distress, no wheezing, rales or rhonchi, on RA  Abdomen: +BS, soft, non-tender, nondistended, no guarding rigidity or rebound tenderness, no masses noted  MSK: Negative for edema, No joint swelling, normal movements of all extremities.   Neuro: No  focal deficits, normal motor function, normal sensation, follows all commands  Skin- No lesions, contusions, or erythema.  Psychiatric: Judgment intact. Appropriate mood, affect and behavior   Outpatient Follow-Up  No future appointments.    > 30 mins spent coordinating/preparing discharge   Frank Puente DO

## 2024-12-09 ENCOUNTER — DOCUMENTATION (OUTPATIENT)
Dept: CARDIAC REHAB | Facility: HOSPITAL | Age: 69
End: 2024-12-09
Payer: MEDICARE

## 2024-12-09 LAB
ACT BLD: 134 SEC (ref 89–169)
ACT BLD: 228 SEC (ref 89–169)
ATRIAL RATE: 58 BPM
ATRIAL RATE: 72 BPM
P AXIS: 60 DEGREES
P AXIS: 64 DEGREES
P OFFSET: 197 MS
P OFFSET: 199 MS
P ONSET: 135 MS
P ONSET: 139 MS
PR INTERVAL: 166 MS
PR INTERVAL: 182 MS
Q ONSET: 222 MS
Q ONSET: 226 MS
QRS COUNT: 10 BEATS
QRS COUNT: 11 BEATS
QRS DURATION: 136 MS
QRS DURATION: 136 MS
QT INTERVAL: 422 MS
QT INTERVAL: 440 MS
QTC CALCULATION(BAZETT): 431 MS
QTC CALCULATION(BAZETT): 462 MS
QTC FREDERICIA: 434 MS
QTC FREDERICIA: 448 MS
R AXIS: -29 DEGREES
R AXIS: -36 DEGREES
T AXIS: 25 DEGREES
T AXIS: 30 DEGREES
T OFFSET: 433 MS
T OFFSET: 446 MS
VENTRICULAR RATE: 58 BPM
VENTRICULAR RATE: 72 BPM

## 2024-12-09 NOTE — PROGRESS NOTES
Spoke with patient/wife via phone. Verified with patient that stent card was given. Stent education/handout given to patient/wife. Discussed importance of taking medication as prescribed/following up with physician appointments. NSTEMI education/handout given to patient/wife. Discussed benefits of heart healthy diet/importance of maintaining a healthy weight. Discussed cardiac rehab. Patient encouraged to call cardiac rehab post discharge.

## 2024-12-18 ENCOUNTER — OFFICE VISIT (OUTPATIENT)
Dept: CARDIOLOGY | Facility: CLINIC | Age: 69
End: 2024-12-18
Payer: MEDICARE

## 2024-12-18 VITALS
DIASTOLIC BLOOD PRESSURE: 72 MMHG | WEIGHT: 251 LBS | HEART RATE: 68 BPM | BODY MASS INDEX: 40.51 KG/M2 | SYSTOLIC BLOOD PRESSURE: 124 MMHG

## 2024-12-18 DIAGNOSIS — I25.10 ASHD (ARTERIOSCLEROTIC HEART DISEASE): Primary | ICD-10-CM

## 2024-12-18 DIAGNOSIS — I21.4 NSTEMI (NON-ST ELEVATED MYOCARDIAL INFARCTION) (MULTI): ICD-10-CM

## 2024-12-18 DIAGNOSIS — I10 PRIMARY HYPERTENSION: ICD-10-CM

## 2024-12-18 DIAGNOSIS — E78.2 MIXED HYPERLIPIDEMIA: ICD-10-CM

## 2024-12-18 PROCEDURE — 4010F ACE/ARB THERAPY RXD/TAKEN: CPT | Performed by: INTERNAL MEDICINE

## 2024-12-18 PROCEDURE — 3049F LDL-C 100-129 MG/DL: CPT | Performed by: INTERNAL MEDICINE

## 2024-12-18 PROCEDURE — 1036F TOBACCO NON-USER: CPT | Performed by: INTERNAL MEDICINE

## 2024-12-18 PROCEDURE — 1126F AMNT PAIN NOTED NONE PRSNT: CPT | Performed by: INTERNAL MEDICINE

## 2024-12-18 PROCEDURE — 3074F SYST BP LT 130 MM HG: CPT | Performed by: INTERNAL MEDICINE

## 2024-12-18 PROCEDURE — 1111F DSCHRG MED/CURRENT MED MERGE: CPT | Performed by: INTERNAL MEDICINE

## 2024-12-18 PROCEDURE — 99214 OFFICE O/P EST MOD 30 MIN: CPT | Performed by: INTERNAL MEDICINE

## 2024-12-18 PROCEDURE — 3078F DIAST BP <80 MM HG: CPT | Performed by: INTERNAL MEDICINE

## 2024-12-18 PROCEDURE — 1159F MED LIST DOCD IN RCRD: CPT | Performed by: INTERNAL MEDICINE

## 2024-12-18 RX ORDER — LOSARTAN POTASSIUM 50 MG/1
50 TABLET ORAL DAILY
Qty: 90 TABLET | Refills: 3 | Status: SHIPPED | OUTPATIENT
Start: 2024-12-18

## 2024-12-18 RX ORDER — METOPROLOL SUCCINATE 50 MG/1
50 TABLET, EXTENDED RELEASE ORAL DAILY
Qty: 90 TABLET | Refills: 3 | Status: SHIPPED | OUTPATIENT
Start: 2024-12-18

## 2024-12-18 RX ORDER — ROSUVASTATIN CALCIUM 20 MG/1
20 TABLET, COATED ORAL NIGHTLY
Qty: 90 TABLET | Refills: 3 | Status: SHIPPED | OUTPATIENT
Start: 2024-12-18

## 2024-12-18 ASSESSMENT — PATIENT HEALTH QUESTIONNAIRE - PHQ9
SUM OF ALL RESPONSES TO PHQ9 QUESTIONS 1 AND 2: 0
1. LITTLE INTEREST OR PLEASURE IN DOING THINGS: NOT AT ALL
2. FEELING DOWN, DEPRESSED OR HOPELESS: NOT AT ALL

## 2024-12-18 ASSESSMENT — ENCOUNTER SYMPTOMS
HEMATURIA: 0
SHORTNESS OF BREATH: 0
BLURRED VISION: 0
PARESTHESIAS: 0
COUGH: 0
PALPITATIONS: 0
ABDOMINAL PAIN: 0
DYSPNEA ON EXERTION: 0
DEPRESSION: 0
OCCASIONAL FEELINGS OF UNSTEADINESS: 1
DYSURIA: 0
NUMBNESS: 0
LOSS OF SENSATION IN FEET: 0

## 2024-12-18 ASSESSMENT — PAIN SCALES - GENERAL: PAINLEVEL_OUTOF10: 0-NO PAIN

## 2024-12-18 NOTE — ASSESSMENT & PLAN NOTE
Will check a fasting lipid panel on return visit.  Continue the rosuvastatin at 20 mg daily at this time

## 2024-12-18 NOTE — ASSESSMENT & PLAN NOTE
Patient is recovering well from the non-ST elevation myocardial infarction.  He experiences no chest pain or anginal symptoms at this time.  He states that he feels better and is able to do more activity without becoming short of breath now.  Will enroll into cardiac rehab.  Will bring back in 3 months and reassess.

## 2024-12-18 NOTE — PROGRESS NOTES
Subjective   Albin Estrada is a 69 y.o. male.    Chief Complaint:  Hospital Follow-up    HPI  Patient states he feels great.  Walking his dog and does not become short of breath like he did prior.  Overall doing very well.    Review of Systems   Constitutional: Negative for malaise/fatigue.   HENT:  Negative for congestion.    Eyes:  Negative for blurred vision.   Cardiovascular:  Negative for chest pain, dyspnea on exertion and palpitations.   Respiratory:  Negative for cough and shortness of breath.    Musculoskeletal:  Negative for joint pain.   Gastrointestinal:  Negative for abdominal pain.   Genitourinary:  Negative for dysuria and hematuria.   Neurological:  Negative for numbness and paresthesias.       Objective   Constitutional:       Appearance: Not in distress.   Eyes:      Conjunctiva/sclera: Conjunctivae normal.   Neck:      Vascular: JVD normal.   Pulmonary:      Breath sounds: Normal breath sounds. No wheezing. No rhonchi. No rales.   Cardiovascular:      Normal rate. Regular rhythm.      Murmurs: There is no murmur.      No gallop.  No click. No rub.   Abdominal:      Palpations: Abdomen is soft.   Neurological:      General: No focal deficit present.      Mental Status: Alert.         Lab Review:   Admission on 12/06/2024, Discharged on 12/08/2024   Component Date Value    Ventricular Rate 12/06/2024 72     Atrial Rate 12/06/2024 72     AL Interval 12/06/2024 166     QRS Duration 12/06/2024 136     QT Interval 12/06/2024 422     QTC Calculation(Bazett) 12/06/2024 462     P Axis 12/06/2024 64     R Axis 12/06/2024 -36     T Axis 12/06/2024 25     QRS Count 12/06/2024 11     Q Onset 12/06/2024 222     P Onset 12/06/2024 139     P Offset 12/06/2024 199     T Offset 12/06/2024 433     QTC Fredericia 12/06/2024 448     WBC 12/06/2024 5.8     nRBC 12/06/2024 0.0     RBC 12/06/2024 4.37 (L)     Hemoglobin 12/06/2024 12.3 (L)     Hematocrit 12/06/2024 37.8 (L)     MCV 12/06/2024 87     MCH 12/06/2024  28.1     MCHC 12/06/2024 32.5     RDW 12/06/2024 15.1 (H)     Platelets 12/06/2024 255     Neutrophils % 12/06/2024 65.5     Immature Granulocytes %,* 12/06/2024 0.7     Lymphocytes % 12/06/2024 22.4     Monocytes % 12/06/2024 9.4     Eosinophils % 12/06/2024 1.5     Basophils % 12/06/2024 0.5     Neutrophils Absolute 12/06/2024 3.82     Immature Granulocytes Ab* 12/06/2024 0.04     Lymphocytes Absolute 12/06/2024 1.31     Monocytes Absolute 12/06/2024 0.55     Eosinophils Absolute 12/06/2024 0.09     Basophils Absolute 12/06/2024 0.03     Glucose 12/06/2024 105 (H)     Sodium 12/06/2024 140     Potassium 12/06/2024 3.4 (L)     Chloride 12/06/2024 110 (H)     Bicarbonate 12/06/2024 21     Anion Gap 12/06/2024 12     Urea Nitrogen 12/06/2024 16     Creatinine 12/06/2024 1.04     eGFR 12/06/2024 77     Calcium 12/06/2024 7.1 (L)     Albumin 12/06/2024 3.5     Alkaline Phosphatase 12/06/2024 67     Total Protein 12/06/2024 5.9 (L)     AST 12/06/2024 12     Bilirubin, Total 12/06/2024 0.2     ALT 12/06/2024 11     BNP 12/06/2024 27     Magnesium 12/06/2024 1.82     D-Dimer Non VTE, Quant (* 12/06/2024 3.73 (H)     Troponin I, High Sensiti* 12/06/2024 29 (H)     Troponin I, High Sensiti* 12/06/2024 81 (HH)     aPTT 12/06/2024 26.9     Cholesterol 12/07/2024 197     HDL-Cholesterol 12/07/2024 52.1     Cholesterol/HDL Ratio 12/07/2024 3.8     LDL Calculated 12/07/2024 107 (H)     VLDL 12/07/2024 38     Triglycerides 12/07/2024 191 (H)     Non HDL Cholesterol 12/07/2024 145     Troponin I, High Sensiti* 12/07/2024 2,695 (HH)     AV pk kamille 12/07/2024 1.32     LVOT diam 12/07/2024 2.10     AV mn grad 12/07/2024 3     MV E/A ratio 12/07/2024 1.12     Tricuspid annular plane * 12/07/2024 2.5     LV Biplane EF 12/07/2024 62     LA vol index A/L 12/07/2024 44.4     MV avg E/e' ratio 12/07/2024 2.52     LV EF 12/07/2024 58     RV free wall pk S' 12/07/2024 16.96     LVIDd 12/07/2024 5.08     AV pk grad 12/07/2024 7     Aortic  Valve Area by Con* 12/07/2024 2.59     Aortic Valve Area by Con* 12/07/2024 2.20     LV A4C EF 12/07/2024 61.7     Troponin I, High Sensiti* 12/07/2024 11,214 (HH)     POCT Glucose 12/07/2024 142 (H)     aPTT 12/07/2024 36.7 (H)     Ventricular Rate 12/07/2024 58     Atrial Rate 12/07/2024 58     WI Interval 12/07/2024 182     QRS Duration 12/07/2024 136     QT Interval 12/07/2024 440     QTC Calculation(Bazett) 12/07/2024 431     P Axis 12/07/2024 60     R Reading 12/07/2024 -29     T Axis 12/07/2024 30     QRS Count 12/07/2024 10     Q Onset 12/07/2024 226     P Onset 12/07/2024 135     P Offset 12/07/2024 197     T Offset 12/07/2024 446     QTC Fredericia 12/07/2024 434     POCT Glucose 12/07/2024 129 (H)     POCT Glucose 12/07/2024 125 (H)     POCT Glucose 12/07/2024 136 (H)     WBC 12/08/2024 6.1     nRBC 12/08/2024 0.0     RBC 12/08/2024 4.10 (L)     Hemoglobin 12/08/2024 11.3 (L)     Hematocrit 12/08/2024 34.2 (L)     MCV 12/08/2024 83     MCH 12/08/2024 27.6     MCHC 12/08/2024 33.0     RDW 12/08/2024 15.2 (H)     Platelets 12/08/2024 213     Neutrophils % 12/08/2024 73.6     Immature Granulocytes %,* 12/08/2024 0.5     Lymphocytes % 12/08/2024 13.8     Monocytes % 12/08/2024 9.5     Eosinophils % 12/08/2024 2.1     Basophils % 12/08/2024 0.5     Neutrophils Absolute 12/08/2024 4.48     Immature Granulocytes Ab* 12/08/2024 0.03     Lymphocytes Absolute 12/08/2024 0.84 (L)     Monocytes Absolute 12/08/2024 0.58     Eosinophils Absolute 12/08/2024 0.13     Basophils Absolute 12/08/2024 0.03     Glucose 12/08/2024 134 (H)     Sodium 12/08/2024 137     Potassium 12/08/2024 4.2     Chloride 12/08/2024 104     Bicarbonate 12/08/2024 28     Anion Gap 12/08/2024 9 (L)     Urea Nitrogen 12/08/2024 16     Creatinine 12/08/2024 0.98     eGFR 12/08/2024 83     Calcium 12/08/2024 8.2 (L)     Phosphorus 12/08/2024 3.1     Albumin 12/08/2024 3.5     POCT Glucose 12/07/2024 145 (H)     POCT Glucose 12/08/2024 123 (H)     POCT  Glucose 12/08/2024 118 (H)     POCT Glucose 12/08/2024 133 (H)     POCT Activated Clotting * 12/07/2024 228 (H)     POCT Activated Clotting * 12/07/2024 134        Assessment/Plan   The primary encounter diagnosis was ASHD (arteriosclerotic heart disease). Diagnoses of Mixed hyperlipidemia and Primary hypertension were also pertinent to this visit.    ASHD (arteriosclerotic heart disease)  Patient is recovering well from the non-ST elevation myocardial infarction.  He experiences no chest pain or anginal symptoms at this time.  He states that he feels better and is able to do more activity without becoming short of breath now.  Will enroll into cardiac rehab.  Will bring back in 3 months and reassess.    Mixed hyperlipidemia  Will check a fasting lipid panel on return visit.  Continue the rosuvastatin at 20 mg daily at this time    Primary hypertension  Blood pressure adequately controlled on the combination of losartan and metoprolol succinate.  Will check a basic metabolic panel prior to return visit.  He will likely have the labs drawn at the [facility].

## 2024-12-18 NOTE — ASSESSMENT & PLAN NOTE
Blood pressure adequately controlled on the combination of losartan and metoprolol succinate.  Will check a basic metabolic panel prior to return visit.  He will likely have the labs drawn at the [facility].

## 2024-12-30 ENCOUNTER — APPOINTMENT (OUTPATIENT)
Dept: CARDIOLOGY | Facility: CLINIC | Age: 69
End: 2024-12-30
Payer: MEDICARE

## 2025-03-23 NOTE — ASSESSMENT & PLAN NOTE
Reviewed metabolic panel done at the [facility] and all numbers look good.  Continue the losartan and metoprolol and follow on a regular basis.

## 2025-03-23 NOTE — ASSESSMENT & PLAN NOTE
Lipids done at the [facility], LDL 36, very good.  Continue the rosuvastatin at 20 mg daily.

## 2025-04-01 ENCOUNTER — OFFICE VISIT (OUTPATIENT)
Facility: CLINIC | Age: 70
End: 2025-04-01
Payer: MEDICARE

## 2025-04-01 VITALS
WEIGHT: 263 LBS | DIASTOLIC BLOOD PRESSURE: 74 MMHG | SYSTOLIC BLOOD PRESSURE: 116 MMHG | BODY MASS INDEX: 42.45 KG/M2 | OXYGEN SATURATION: 98 % | HEART RATE: 56 BPM

## 2025-04-01 DIAGNOSIS — I10 PRIMARY HYPERTENSION: ICD-10-CM

## 2025-04-01 DIAGNOSIS — I25.10 ASHD (ARTERIOSCLEROTIC HEART DISEASE): Primary | ICD-10-CM

## 2025-04-01 DIAGNOSIS — E78.2 MIXED HYPERLIPIDEMIA: ICD-10-CM

## 2025-04-01 PROCEDURE — 1159F MED LIST DOCD IN RCRD: CPT | Performed by: INTERNAL MEDICINE

## 2025-04-01 PROCEDURE — 1036F TOBACCO NON-USER: CPT | Performed by: INTERNAL MEDICINE

## 2025-04-01 PROCEDURE — 99214 OFFICE O/P EST MOD 30 MIN: CPT | Performed by: INTERNAL MEDICINE

## 2025-04-01 PROCEDURE — 1126F AMNT PAIN NOTED NONE PRSNT: CPT | Performed by: INTERNAL MEDICINE

## 2025-04-01 PROCEDURE — 3074F SYST BP LT 130 MM HG: CPT | Performed by: INTERNAL MEDICINE

## 2025-04-01 PROCEDURE — 4010F ACE/ARB THERAPY RXD/TAKEN: CPT | Performed by: INTERNAL MEDICINE

## 2025-04-01 PROCEDURE — 3078F DIAST BP <80 MM HG: CPT | Performed by: INTERNAL MEDICINE

## 2025-04-01 RX ORDER — CLOPIDOGREL BISULFATE 75 MG/1
75 TABLET ORAL DAILY
Qty: 94 TABLET | Refills: 3 | Status: SHIPPED | OUTPATIENT
Start: 2025-04-01 | End: 2026-04-01

## 2025-04-01 RX ORDER — ASPIRIN 81 MG/1
81 TABLET ORAL DAILY
COMMUNITY

## 2025-04-01 ASSESSMENT — ENCOUNTER SYMPTOMS
HEMATURIA: 0
PALPITATIONS: 0
DYSPNEA ON EXERTION: 0
BLURRED VISION: 0
DYSURIA: 0
COUGH: 0
ABDOMINAL PAIN: 0
OCCASIONAL FEELINGS OF UNSTEADINESS: 0
NUMBNESS: 0
LOSS OF SENSATION IN FEET: 0
PARESTHESIAS: 0
SHORTNESS OF BREATH: 1
DEPRESSION: 0

## 2025-04-01 ASSESSMENT — PATIENT HEALTH QUESTIONNAIRE - PHQ9
2. FEELING DOWN, DEPRESSED OR HOPELESS: NOT AT ALL
SUM OF ALL RESPONSES TO PHQ9 QUESTIONS 1 AND 2: 0
1. LITTLE INTEREST OR PLEASURE IN DOING THINGS: NOT AT ALL

## 2025-04-01 ASSESSMENT — PAIN SCALES - GENERAL: PAINLEVEL_OUTOF10: 0-NO PAIN

## 2025-04-01 ASSESSMENT — LIFESTYLE VARIABLES: TOTAL SCORE: 0

## 2025-04-01 NOTE — ASSESSMENT & PLAN NOTE
"Clinically doing well with no anginal type symptoms.  Shortness of breath could be secondary to the Brilinta.  Will discontinue the Brilinta and add clopidogrel 300 mg on day 1 then 75 mg daily after that.  The patient did not enroll in cardiac rehab, states he did not like \"their attitude\".  He is walking on her daily basis at this time and does not want to enroll.  Will bring the patient back in approximately 4 months and reassess symptoms.  "

## 2025-04-01 NOTE — PROGRESS NOTES
Subjective   Albin Estrada is a 70 y.o. male.    Chief Complaint:  3 month f/u (Pt concern about SOB MILD ACTIVITY)    HPI  No chest pain or anginal symptoms.  Have some more shortness of breath which he thinks could be from some of the medications he is taking.  He is walking his dog on a daily basis but is can go his long as he used to.    Review of Systems   Constitutional: Negative for malaise/fatigue.   HENT:  Negative for congestion.    Eyes:  Negative for blurred vision.   Cardiovascular:  Negative for chest pain, dyspnea on exertion and palpitations.   Respiratory:  Positive for shortness of breath. Negative for cough.    Musculoskeletal:  Negative for joint pain.   Gastrointestinal:  Negative for abdominal pain.   Genitourinary:  Negative for dysuria and hematuria.   Neurological:  Negative for numbness and paresthesias.       Objective   Constitutional:       Appearance: Not in distress.   Eyes:      Conjunctiva/sclera: Conjunctivae normal.   Neck:      Vascular: JVD normal.   Pulmonary:      Breath sounds: Normal breath sounds. No wheezing. No rhonchi. No rales.   Cardiovascular:      Normal rate. Regular rhythm.      Murmurs: There is no murmur.      No gallop.  No click. No rub.   Abdominal:      Palpations: Abdomen is soft.   Neurological:      General: No focal deficit present.      Mental Status: Alert.         Lab Review:   No visits with results within 2 Month(s) from this visit.   Latest known visit with results is:   Admission on 12/06/2024, Discharged on 12/08/2024   Component Date Value    Ventricular Rate 12/06/2024 72     Atrial Rate 12/06/2024 72     AK Interval 12/06/2024 166     QRS Duration 12/06/2024 136     QT Interval 12/06/2024 422     QTC Calculation(Bazett) 12/06/2024 462     P Axis 12/06/2024 64     R Axis 12/06/2024 -36     T Axis 12/06/2024 25     QRS Count 12/06/2024 11     Q Onset 12/06/2024 222     P Onset 12/06/2024 139     P Offset 12/06/2024 199     T Offset 12/06/2024  433     QTC Fredericia 12/06/2024 448     WBC 12/06/2024 5.8     nRBC 12/06/2024 0.0     RBC 12/06/2024 4.37 (L)     Hemoglobin 12/06/2024 12.3 (L)     Hematocrit 12/06/2024 37.8 (L)     MCV 12/06/2024 87     MCH 12/06/2024 28.1     MCHC 12/06/2024 32.5     RDW 12/06/2024 15.1 (H)     Platelets 12/06/2024 255     Neutrophils % 12/06/2024 65.5     Immature Granulocytes %,* 12/06/2024 0.7     Lymphocytes % 12/06/2024 22.4     Monocytes % 12/06/2024 9.4     Eosinophils % 12/06/2024 1.5     Basophils % 12/06/2024 0.5     Neutrophils Absolute 12/06/2024 3.82     Immature Granulocytes Ab* 12/06/2024 0.04     Lymphocytes Absolute 12/06/2024 1.31     Monocytes Absolute 12/06/2024 0.55     Eosinophils Absolute 12/06/2024 0.09     Basophils Absolute 12/06/2024 0.03     Glucose 12/06/2024 105 (H)     Sodium 12/06/2024 140     Potassium 12/06/2024 3.4 (L)     Chloride 12/06/2024 110 (H)     Bicarbonate 12/06/2024 21     Anion Gap 12/06/2024 12     Urea Nitrogen 12/06/2024 16     Creatinine 12/06/2024 1.04     eGFR 12/06/2024 77     Calcium 12/06/2024 7.1 (L)     Albumin 12/06/2024 3.5     Alkaline Phosphatase 12/06/2024 67     Total Protein 12/06/2024 5.9 (L)     AST 12/06/2024 12     Bilirubin, Total 12/06/2024 0.2     ALT 12/06/2024 11     BNP 12/06/2024 27     Magnesium 12/06/2024 1.82     D-Dimer Non VTE, Quant (* 12/06/2024 3.73 (H)     Troponin I, High Sensiti* 12/06/2024 29 (H)     Troponin I, High Sensiti* 12/06/2024 81 (HH)     aPTT 12/06/2024 26.9     Cholesterol 12/07/2024 197     HDL-Cholesterol 12/07/2024 52.1     Cholesterol/HDL Ratio 12/07/2024 3.8     LDL Calculated 12/07/2024 107 (H)     VLDL 12/07/2024 38     Triglycerides 12/07/2024 191 (H)     Non HDL Cholesterol 12/07/2024 145     Troponin I, High Sensiti* 12/07/2024 2,695 (HH)     AV pk kamille 12/07/2024 1.32     LVOT diam 12/07/2024 2.10     AV mn grad 12/07/2024 3     MV E/A ratio 12/07/2024 1.12     Tricuspid annular plane * 12/07/2024 2.5     LV Biplane  EF 12/07/2024 62     LA vol index A/L 12/07/2024 44.4     MV avg E/e' ratio 12/07/2024 2.52     LV EF 12/07/2024 58     RV free wall pk S' 12/07/2024 16.96     LVIDd 12/07/2024 5.08     AV pk grad 12/07/2024 7     Aortic Valve Area by Con* 12/07/2024 2.59     Aortic Valve Area by Con* 12/07/2024 2.20     LV A4C EF 12/07/2024 61.7     Troponin I, High Sensiti* 12/07/2024 11,214 (HH)     POCT Glucose 12/07/2024 142 (H)     aPTT 12/07/2024 36.7 (H)     Ventricular Rate 12/07/2024 58     Atrial Rate 12/07/2024 58     WI Interval 12/07/2024 182     QRS Duration 12/07/2024 136     QT Interval 12/07/2024 440     QTC Calculation(Bazett) 12/07/2024 431     P Axis 12/07/2024 60     R Taylor 12/07/2024 -29     T Axis 12/07/2024 30     QRS Count 12/07/2024 10     Q Onset 12/07/2024 226     P Onset 12/07/2024 135     P Offset 12/07/2024 197     T Offset 12/07/2024 446     QTC Fredericia 12/07/2024 434     POCT Glucose 12/07/2024 129 (H)     POCT Glucose 12/07/2024 125 (H)     POCT Glucose 12/07/2024 136 (H)     WBC 12/08/2024 6.1     nRBC 12/08/2024 0.0     RBC 12/08/2024 4.10 (L)     Hemoglobin 12/08/2024 11.3 (L)     Hematocrit 12/08/2024 34.2 (L)     MCV 12/08/2024 83     MCH 12/08/2024 27.6     MCHC 12/08/2024 33.0     RDW 12/08/2024 15.2 (H)     Platelets 12/08/2024 213     Neutrophils % 12/08/2024 73.6     Immature Granulocytes %,* 12/08/2024 0.5     Lymphocytes % 12/08/2024 13.8     Monocytes % 12/08/2024 9.5     Eosinophils % 12/08/2024 2.1     Basophils % 12/08/2024 0.5     Neutrophils Absolute 12/08/2024 4.48     Immature Granulocytes Ab* 12/08/2024 0.03     Lymphocytes Absolute 12/08/2024 0.84 (L)     Monocytes Absolute 12/08/2024 0.58     Eosinophils Absolute 12/08/2024 0.13     Basophils Absolute 12/08/2024 0.03     Glucose 12/08/2024 134 (H)     Sodium 12/08/2024 137     Potassium 12/08/2024 4.2     Chloride 12/08/2024 104     Bicarbonate 12/08/2024 28     Anion Gap 12/08/2024 9 (L)     Urea Nitrogen 12/08/2024 16   "   Creatinine 12/08/2024 0.98     eGFR 12/08/2024 83     Calcium 12/08/2024 8.2 (L)     Phosphorus 12/08/2024 3.1     Albumin 12/08/2024 3.5     POCT Glucose 12/07/2024 145 (H)     POCT Glucose 12/08/2024 123 (H)     POCT Glucose 12/08/2024 118 (H)     POCT Glucose 12/08/2024 133 (H)     POCT Activated Clotting * 12/07/2024 228 (H)     POCT Activated Clotting * 12/07/2024 134        Assessment/Plan   The primary encounter diagnosis was ASHD (arteriosclerotic heart disease). Diagnoses of Mixed hyperlipidemia and Primary hypertension were also pertinent to this visit.    Mixed hyperlipidemia  Lipids done at the [facility], LDL 36, very good.  Continue the rosuvastatin at 20 mg daily.    Primary hypertension  Reviewed metabolic panel done at the [facility] and all numbers look good.  Continue the losartan and metoprolol and follow on a regular basis.    ASHD (arteriosclerotic heart disease)  Clinically doing well with no anginal type symptoms.  Shortness of breath could be secondary to the Brilinta.  Will discontinue the Brilinta and add clopidogrel 300 mg on day 1 then 75 mg daily after that.  The patient did not enroll in cardiac rehab, states he did not like \"their attitude\".  He is walking on her daily basis at this time and does not want to enroll.  Will bring the patient back in approximately 4 months and reassess symptoms.   "

## 2025-07-31 NOTE — ASSESSMENT & PLAN NOTE
On last visit we did switch from Brilinta to clopidogrel secondary to some shortness of breath symptoms he was experiencing.  Shortness of breath symptoms did resolve with this transition of antiplatelet therapy.  Patient suffered non-ST elevation myocardial infarction back in December underwent successful percutaneous coronary invention with placement of a drug-eluting stent into the left anterior descending artery.  He has made a nice recovery and feels well with no anginal type symptoms.  Continue dual antiplatelet therapy for 1 year and then consider transition to monotherapy at that time.

## 2025-07-31 NOTE — ASSESSMENT & PLAN NOTE
LDL cholesterol was 36 on lipid panel done in February.  Rosuvastatin has been effective in reducing the LDL cholesterol.  Plan on rechecking fasting lipid panel on return visit.  The patient now with significant myalgias.  Will discontinue the rosuvastatin altogether and after a 2 to 3-month washout we will start pravastatin at 20 mg once daily.  The patient had also had adverse reaction to atorvastatin in the past.  Will plan on checking fasting lipid panel on return visit.

## 2025-08-04 ENCOUNTER — OFFICE VISIT (OUTPATIENT)
Facility: CLINIC | Age: 70
End: 2025-08-04
Payer: MEDICARE

## 2025-08-04 VITALS
WEIGHT: 269 LBS | HEART RATE: 58 BPM | SYSTOLIC BLOOD PRESSURE: 118 MMHG | BODY MASS INDEX: 43.23 KG/M2 | HEIGHT: 66 IN | DIASTOLIC BLOOD PRESSURE: 76 MMHG | OXYGEN SATURATION: 98 %

## 2025-08-04 DIAGNOSIS — I10 PRIMARY HYPERTENSION: ICD-10-CM

## 2025-08-04 DIAGNOSIS — E78.2 MIXED HYPERLIPIDEMIA: ICD-10-CM

## 2025-08-04 DIAGNOSIS — I25.10 ASHD (ARTERIOSCLEROTIC HEART DISEASE): Primary | ICD-10-CM

## 2025-08-04 PROCEDURE — 3078F DIAST BP <80 MM HG: CPT | Performed by: INTERNAL MEDICINE

## 2025-08-04 PROCEDURE — 3008F BODY MASS INDEX DOCD: CPT | Performed by: INTERNAL MEDICINE

## 2025-08-04 PROCEDURE — 1126F AMNT PAIN NOTED NONE PRSNT: CPT | Performed by: INTERNAL MEDICINE

## 2025-08-04 PROCEDURE — G2211 COMPLEX E/M VISIT ADD ON: HCPCS | Performed by: INTERNAL MEDICINE

## 2025-08-04 PROCEDURE — 1159F MED LIST DOCD IN RCRD: CPT | Performed by: INTERNAL MEDICINE

## 2025-08-04 PROCEDURE — 99212 OFFICE O/P EST SF 10 MIN: CPT

## 2025-08-04 PROCEDURE — 99214 OFFICE O/P EST MOD 30 MIN: CPT | Performed by: INTERNAL MEDICINE

## 2025-08-04 PROCEDURE — 4010F ACE/ARB THERAPY RXD/TAKEN: CPT | Performed by: INTERNAL MEDICINE

## 2025-08-04 PROCEDURE — 3074F SYST BP LT 130 MM HG: CPT | Performed by: INTERNAL MEDICINE

## 2025-08-04 RX ORDER — PRAVASTATIN SODIUM 20 MG/1
20 TABLET ORAL DAILY
Qty: 90 TABLET | Refills: 3 | Status: SHIPPED | OUTPATIENT
Start: 2025-08-04 | End: 2026-08-04

## 2025-08-04 ASSESSMENT — LIFESTYLE VARIABLES
HAVE YOU OR SOMEONE ELSE BEEN INJURED AS A RESULT OF YOUR DRINKING: NO
SKIP TO QUESTIONS 9-10: 1
HOW MANY STANDARD DRINKS CONTAINING ALCOHOL DO YOU HAVE ON A TYPICAL DAY: 1 OR 2
HAS A RELATIVE, FRIEND, DOCTOR, OR ANOTHER HEALTH PROFESSIONAL EXPRESSED CONCERN ABOUT YOUR DRINKING OR SUGGESTED YOU CUT DOWN: NO
HOW OFTEN DO YOU HAVE A DRINK CONTAINING ALCOHOL: 4 OR MORE TIMES A WEEK
AUDIT-C TOTAL SCORE: 4
HOW OFTEN DO YOU HAVE SIX OR MORE DRINKS ON ONE OCCASION: NEVER
AUDIT TOTAL SCORE: 4

## 2025-08-04 ASSESSMENT — ENCOUNTER SYMPTOMS
LOSS OF SENSATION IN FEET: 0
HEMATURIA: 0
DYSURIA: 0
COUGH: 0
DEPRESSION: 0
OCCASIONAL FEELINGS OF UNSTEADINESS: 0
NUMBNESS: 0
MYALGIAS: 1
ABDOMINAL PAIN: 0
DYSPNEA ON EXERTION: 0
BLURRED VISION: 0
PARESTHESIAS: 0
SHORTNESS OF BREATH: 0
PALPITATIONS: 0

## 2025-08-04 ASSESSMENT — PAIN SCALES - GENERAL: PAINLEVEL_OUTOF10: 0-NO PAIN

## 2025-08-04 NOTE — PROGRESS NOTES
Subjective   Albin Estrada is a 70 y.o. male.    Chief Complaint:  Follow-up (4 Month )    HPI  70-year-old male with coronary artery disease reports for cardiology follow-up visit.  Patient suffered a non-ST elevation myocardial infarction back in December and underwent a successful percutaneous intervention with a drug-eluting stent placed into his left anterior descending artery.  He has made a nice recovery and is doing well overall with no chest pain or anginal type symptoms.  He does state that he has muscle aches and pains and was wondering which of his medications could cause this.  Also on last visit we transition from Brilinta to clopidogrel secondary to shortness of breath symptoms which resolved.    Review of Systems   Constitutional: Negative for malaise/fatigue.   HENT:  Negative for congestion.    Eyes:  Negative for blurred vision.   Cardiovascular:  Negative for chest pain, dyspnea on exertion and palpitations.   Respiratory:  Negative for cough and shortness of breath.    Musculoskeletal:  Positive for myalgias. Negative for joint pain.   Gastrointestinal:  Negative for abdominal pain.   Genitourinary:  Negative for dysuria and hematuria.   Neurological:  Negative for numbness and paresthesias.       Objective   Constitutional:       Appearance: Not in distress.   Eyes:      Conjunctiva/sclera: Conjunctivae normal.   Neck:      Vascular: JVD normal.   Pulmonary:      Breath sounds: Normal breath sounds. No wheezing. No rhonchi. No rales.   Cardiovascular:      Normal rate. Regular rhythm.      Murmurs: There is no murmur.      No gallop.  No click. No rub.   Abdominal:      Palpations: Abdomen is soft.   Neurological:      General: No focal deficit present.      Mental Status: Alert.       Lab Review:       Assessment/Plan   The primary encounter diagnosis was ASHD (arteriosclerotic heart disease). Diagnoses of Mixed hyperlipidemia and Primary hypertension were also pertinent to this  visit.    ASHD (arteriosclerotic heart disease)  On last visit we did switch from Brilinta to clopidogrel secondary to some shortness of breath symptoms he was experiencing.  Shortness of breath symptoms did resolve with this transition of antiplatelet therapy.  Patient suffered non-ST elevation myocardial infarction back in December underwent successful percutaneous coronary invention with placement of a drug-eluting stent into the left anterior descending artery.  He has made a nice recovery and feels well with no anginal type symptoms.  Continue dual antiplatelet therapy for 1 year and then consider transition to monotherapy at that time.    Mixed hyperlipidemia  LDL cholesterol was 36 on lipid panel done in February.  Rosuvastatin has been effective in reducing the LDL cholesterol.  Plan on rechecking fasting lipid panel on return visit.  The patient now with significant myalgias.  Will discontinue the rosuvastatin altogether and after a 2 to 3-month washout we will start pravastatin at 20 mg once daily.  The patient had also had adverse reaction to atorvastatin in the past.  Will plan on checking fasting lipid panel on return visit.    Primary hypertension  Blood pressure is very well-controlled, no change necessary.

## (undated) DEVICE — KIT, NAMIC STANDARD LEFT HEART, CUSTOM, LWMC

## (undated) DEVICE — GUIDEWIRE, J TIP, 3 MM, 0.035 IN X 180 CM, PTFE

## (undated) DEVICE — GUIDEWIRE, PT2 MODERATE SUPPORT, 185CM, J-TIP

## (undated) DEVICE — GLIDESHEATH, SLENDER 6FR, 10CM, NITINOL KIT

## (undated) DEVICE — TR BAND, RADIAL COMPRESSION, LONG, 29CM

## (undated) DEVICE — INFLATION KIT, ADVANTAGE, ENCORE 26 (1/BOX)

## (undated) DEVICE — GLIDEWIRE, ANGLE, STANDARD, .035 X 180CM, 1.5MM J-TIP

## (undated) DEVICE — CATHETER, BALLOON DILATION, EUPHORA SEMICOMPLIANT 2.50  X 12 MM X 142CM

## (undated) DEVICE — CATHETER, BALLOON, NC EUPHORA NONCOMPLIANT 3.0 X 12 X 142CM

## (undated) DEVICE — CATHETER, DIAGNOSTIC, 6 FR INFINITI, PIG

## (undated) DEVICE — CATHETER, INIFINITI, 6FR TG 4.0

## (undated) DEVICE — CATHETER, GUIDE, RUNWAY, CLS3.5 SH, 6FR X 100CM, NO SIDE HOLES, CRV

## (undated) DEVICE — PACK, ANGIO P2, CUSTOM, LAKE